# Patient Record
Sex: FEMALE | Race: WHITE | Employment: UNEMPLOYED | URBAN - METROPOLITAN AREA
[De-identification: names, ages, dates, MRNs, and addresses within clinical notes are randomized per-mention and may not be internally consistent; named-entity substitution may affect disease eponyms.]

---

## 2021-08-24 LAB
ABO, EXTERNAL RESULT: NORMAL
HEP B, EXTERNAL RESULT: NEGATIVE
HEPATITIS C ANTIBODY, EXTERNAL RESULT: NORMAL
HIV, EXTERNAL RESULT: NON REACTIVE
RH FACTOR, EXTERNAL RESULT: POSITIVE
RUBELLA TITER, EXTERNAL RESULT: NORMAL

## 2021-09-09 LAB — C. TRACHOMATIS, EXTERNAL RESULT: NEGATIVE

## 2021-09-21 LAB — N. GONORRHOEAE, EXTERNAL RESULT: NEGATIVE

## 2021-12-01 LAB
GLUCOSE-GTT OB 1 HR: 167
GLUCOSE-GTT OB 3 HR: 104

## 2022-01-24 LAB
GBS, EXTERNAL RESULT: NEGATIVE
RPR, EXTERNAL RESULT: NON REACTIVE

## 2022-01-25 ENCOUNTER — HOSPITAL ENCOUNTER (OUTPATIENT)
Age: 27
Discharge: HOME | End: 2022-01-25
Attending: OBSTETRICS & GYNECOLOGY | Admitting: OBSTETRICS & GYNECOLOGY
Payer: MEDICAID

## 2022-01-25 VITALS
HEART RATE: 93 BPM | SYSTOLIC BLOOD PRESSURE: 125 MMHG | HEIGHT: 67 IN | WEIGHT: 202 LBS | BODY MASS INDEX: 31.71 KG/M2 | DIASTOLIC BLOOD PRESSURE: 77 MMHG | RESPIRATION RATE: 16 BRPM | TEMPERATURE: 97.5 F

## 2022-01-25 PROCEDURE — 99211 OFF/OP EST MAY X REQ PHY/QHP: CPT

## 2022-01-25 NOTE — PROGRESS NOTES
Dr. Jeffry Renae at bedside at this time. Discussing options with patient. Patient declines medications and states she will take Tylenol at home. FHR tracing reviewed. Okay to discharge at this time.

## 2022-01-25 NOTE — PROGRESS NOTES
Patient presents to triage with c/o decreased fetal movement. Patient states she had an appointment in the office yesterday where Dr. Keith Solomon told her her belly was measuring smaller than normal for her gestation. Was also told at her appointment that FHR was elevated. States she has only felt 5-6 movements all day, which 2 were on the way to the hospital.  Will update Dr. Meka Sparks of patient arrival and c/o.

## 2022-01-25 NOTE — PROGRESS NOTES
Spoke with Dr. Sandhya Jacques via telephone to make her aware of patient arrival and c/o. FHR reactive tracing, baseline 140's with moderate variability and accelerations. Per Dr. Sandhya Jacques, patient may be discharged at this time.

## 2022-01-26 NOTE — PROGRESS NOTES
Pt verbalized understanding of verbal and written discharge instructions and denies having questions at this time. Pt left OB unit at [address] ambulatory, undelivered, and in stable condition. Patient is not in active labor.    Tuan Azul RN

## 2022-01-26 NOTE — H&P
[facility] and Delivery Triage Note    CHIEF COMPLAINT:  decreased fetal movement    HISTORY OF PRESENT ILLNESS:      The patient is a 27 y.o. female 44w9d. OB History        2    Para   1    Term   1            AB        Living   1       SAB        IAB        Ectopic        Molar        Multiple        Live Births   1              Patient presents complaining of DFM. Though on evaluation patient reports baby is having plenty of movement since arriving to triage. Contractions: none. She reports Normal fetal movement. She denies vaginal bleeding. She denies leakage of amniotic fluid     Estimated Due Date:  Estimated Date of Delivery: 22    PAST MEDICAL HISTORY:   Past Medical History:   Diagnosis Date    Anxiety     Bipolar 2 disorder ([facility]     Stopped medications with +UPT    Depression     H/O seasonal allergies     Hepatitis C     confirmed  per prenatal    Heroin abuse ([facility]     last used 20- inpatient rehab. Finished outpatient rehab 2021       PAST  SURGICAL HISTORY:   Past Surgical History:   Procedure Laterality Date    LIP REPAIR      WISDOM TOOTH EXTRACTION         SOCIAL HISTORY:     reports that she has been smoking. She has a 2.00 pack-year smoking history. She has never used smokeless tobacco. She reports that she does not drink alcohol and does not use drugs. MEDICATIONS:    Prior to Admission medications    Not on File       REVIEW OF SYSTEMS:     Pertinent items are noted in HPI. PHYSICAL EXAM:    Vital Signs: Blood pressure 125/77, pulse 93, temperature 97.5 °F (36.4 °C), temperature source Oral, resp. rate 16, height 5' 7\" (1.702 m), weight 202 lb (91.6 kg), last menstrual period 2021.     CONSTITUTIONAL:  awake, alert, cooperative, no apparent distress, and appears stated age    Fetal heart rate:  Baseline Heart Rate 140, accelerations:  present  long term variability:  moderate  decelerations:  absent  Cervix: Deferred    Contraction frequency: none    Membranes:  intact    ASSESSMENT:    36w5d. There is no problem list on file for this patient.   DFM    PLAN:  - VSS, FHT reassuring, active fetal movement in the triage  - MD evaluation reported to Dr. Keith Zurita per RN, management per Dr. Suzanne Stanton MD  1/25/2022

## 2022-01-31 ENCOUNTER — HOSPITAL ENCOUNTER (OUTPATIENT)
Age: 27
Discharge: HOME | End: 2022-01-31
Payer: MEDICAID

## 2022-01-31 PROCEDURE — U0005 INFEC AGEN DETEC AMPLI PROBE: HCPCS

## 2022-01-31 PROCEDURE — U0003 INFECTIOUS AGENT DETECTION BY NUCLEIC ACID (DNA OR RNA); SEVERE ACUTE RESPIRATORY SYNDROME CORONAVIRUS 2 (SARS-COV-2) (CORONAVIRUS DISEASE [COVID-19]), AMPLIFIED PROBE TECHNIQUE, MAKING USE OF HIGH THROUGHPUT TECHNOLOGIES AS DESCRIBED BY CMS-2020-01-R: HCPCS

## 2022-02-01 LAB — SARS-COV-2: NOT DETECTED

## 2022-02-10 ENCOUNTER — HOSPITAL ENCOUNTER (INPATIENT)
Age: 27
LOS: 3 days | Discharge: HOME | DRG: 560 | End: 2022-02-13
Attending: OBSTETRICS & GYNECOLOGY | Admitting: OBSTETRICS & GYNECOLOGY
Payer: MEDICAID

## 2022-02-10 ENCOUNTER — PREP FOR PROCEDURE (OUTPATIENT)
Dept: OBGYN | Age: 27
End: 2022-02-10

## 2022-02-10 DIAGNOSIS — O36.5930 POOR FETAL GROWTH AFFECTING MANAGEMENT OF MOTHER IN THIRD TRIMESTER, SINGLE OR UNSPECIFIED FETUS: ICD-10-CM

## 2022-02-10 DIAGNOSIS — O98.419 CHRONIC HEPATITIS C COMPLICATING PREGNANCY, ANTEPARTUM (HCC): ICD-10-CM

## 2022-02-10 DIAGNOSIS — Z86.16 HISTORY OF COVID-19: ICD-10-CM

## 2022-02-10 DIAGNOSIS — O41.03X0 OLIGOHYDRAMNIOS IN THIRD TRIMESTER, SINGLE OR UNSPECIFIED FETUS: ICD-10-CM

## 2022-02-10 DIAGNOSIS — B18.2 CHRONIC HEPATITIS C COMPLICATING PREGNANCY, ANTEPARTUM (HCC): ICD-10-CM

## 2022-02-10 LAB
ABO/RH: NORMAL
AMPHETAMINE SCREEN, URINE: NORMAL
ANTIBODY SCREEN: NORMAL
BARBITURATE SCREEN URINE: NORMAL
BENZODIAZEPINE SCREEN, URINE: NORMAL
BUPRENORPHINE URINE: NORMAL
CANNABINOID SCREEN URINE: NORMAL
COCAINE METABOLITE SCREEN URINE: NORMAL
HCT VFR BLD CALC: 35.7 % (ref 36–48)
HEMOGLOBIN: 11.8 G/DL (ref 12–16)
Lab: NORMAL
MCH RBC QN AUTO: 30.7 PG (ref 26–34)
MCHC RBC AUTO-ENTMCNC: 32.9 G/DL (ref 31–36)
MCV RBC AUTO: 93.4 FL (ref 80–100)
METHADONE SCREEN, URINE: NORMAL
OPIATE SCREEN URINE: NORMAL
OXYCODONE URINE: NORMAL
PDW BLD-RTO: 13.9 % (ref 12.4–15.4)
PH UA: 7
PHENCYCLIDINE SCREEN URINE: NORMAL
PLATELET # BLD: 281 K/UL (ref 135–450)
PMV BLD AUTO: 9.8 FL (ref 5–10.5)
PROPOXYPHENE SCREEN: NORMAL
RBC # BLD: 3.83 M/UL (ref 4–5.2)
WBC # BLD: 11.6 K/UL (ref 4–11)

## 2022-02-10 PROCEDURE — 86850 RBC ANTIBODY SCREEN: CPT

## 2022-02-10 PROCEDURE — 86901 BLOOD TYPING SEROLOGIC RH(D): CPT

## 2022-02-10 PROCEDURE — 86592 SYPHILIS TEST NON-TREP QUAL: CPT

## 2022-02-10 PROCEDURE — 80307 DRUG TEST PRSMV CHEM ANLYZR: CPT

## 2022-02-10 PROCEDURE — 85027 COMPLETE CBC AUTOMATED: CPT

## 2022-02-10 PROCEDURE — 2580000003 HC RX 258: Performed by: OBSTETRICS & GYNECOLOGY

## 2022-02-10 PROCEDURE — 86900 BLOOD TYPING SEROLOGIC ABO: CPT

## 2022-02-10 PROCEDURE — 6370000000 HC RX 637 (ALT 250 FOR IP): Performed by: OBSTETRICS & GYNECOLOGY

## 2022-02-10 PROCEDURE — 1220000000 HC SEMI PRIVATE OB R&B

## 2022-02-10 RX ORDER — ALBUTEROL SULFATE 90 UG/1
2 AEROSOL, METERED RESPIRATORY (INHALATION) EVERY 4 HOURS PRN
COMMUNITY
Start: 2017-11-01

## 2022-02-10 RX ORDER — NALBUPHINE HCL 10 MG/ML
10 AMPUL (ML) INJECTION
Status: DISCONTINUED | OUTPATIENT
Start: 2022-02-10 | End: 2022-02-12

## 2022-02-10 RX ORDER — SODIUM CHLORIDE 0.9 % (FLUSH) 0.9 %
5-40 SYRINGE (ML) INJECTION EVERY 12 HOURS SCHEDULED
Status: CANCELLED | OUTPATIENT
Start: 2022-02-10

## 2022-02-10 RX ORDER — SODIUM CHLORIDE 0.9 % (FLUSH) 0.9 %
5-40 SYRINGE (ML) INJECTION EVERY 12 HOURS SCHEDULED
Status: DISCONTINUED | OUTPATIENT
Start: 2022-02-10 | End: 2022-02-12

## 2022-02-10 RX ORDER — ONDANSETRON 2 MG/ML
4 INJECTION INTRAMUSCULAR; INTRAVENOUS EVERY 6 HOURS PRN
Status: CANCELLED | OUTPATIENT
Start: 2022-02-10

## 2022-02-10 RX ORDER — SODIUM CHLORIDE 9 MG/ML
25 INJECTION, SOLUTION INTRAVENOUS PRN
Status: CANCELLED | OUTPATIENT
Start: 2022-02-10

## 2022-02-10 RX ORDER — BUTORPHANOL TARTRATE 1 MG/ML
1 INJECTION, SOLUTION INTRAMUSCULAR; INTRAVENOUS
Status: DISCONTINUED | OUTPATIENT
Start: 2022-02-10 | End: 2022-02-12

## 2022-02-10 RX ORDER — SODIUM CHLORIDE, SODIUM LACTATE, POTASSIUM CHLORIDE, CALCIUM CHLORIDE 600; 310; 30; 20 MG/100ML; MG/100ML; MG/100ML; MG/100ML
INJECTION, SOLUTION INTRAVENOUS CONTINUOUS
Status: DISCONTINUED | OUTPATIENT
Start: 2022-02-10 | End: 2022-02-12

## 2022-02-10 RX ORDER — SODIUM CHLORIDE, SODIUM LACTATE, POTASSIUM CHLORIDE, AND CALCIUM CHLORIDE .6; .31; .03; .02 G/100ML; G/100ML; G/100ML; G/100ML
1000 INJECTION, SOLUTION INTRAVENOUS PRN
Status: DISCONTINUED | OUTPATIENT
Start: 2022-02-10 | End: 2022-02-12

## 2022-02-10 RX ORDER — DOCUSATE SODIUM 100 MG/1
100 CAPSULE, LIQUID FILLED ORAL 2 TIMES DAILY
Status: CANCELLED | OUTPATIENT
Start: 2022-02-10

## 2022-02-10 RX ORDER — SODIUM CHLORIDE 9 MG/ML
25 INJECTION, SOLUTION INTRAVENOUS PRN
Status: DISCONTINUED | OUTPATIENT
Start: 2022-02-10 | End: 2022-02-12

## 2022-02-10 RX ORDER — SODIUM CHLORIDE, SODIUM LACTATE, POTASSIUM CHLORIDE, AND CALCIUM CHLORIDE .6; .31; .03; .02 G/100ML; G/100ML; G/100ML; G/100ML
500 INJECTION, SOLUTION INTRAVENOUS PRN
Status: CANCELLED | OUTPATIENT
Start: 2022-02-10

## 2022-02-10 RX ORDER — DOCUSATE SODIUM 100 MG/1
100 CAPSULE, LIQUID FILLED ORAL 2 TIMES DAILY
Status: DISCONTINUED | OUTPATIENT
Start: 2022-02-10 | End: 2022-02-12

## 2022-02-10 RX ORDER — BUTORPHANOL TARTRATE 1 MG/ML
1 INJECTION, SOLUTION INTRAMUSCULAR; INTRAVENOUS
Status: CANCELLED | OUTPATIENT
Start: 2022-02-10

## 2022-02-10 RX ORDER — SODIUM CHLORIDE, SODIUM LACTATE, POTASSIUM CHLORIDE, CALCIUM CHLORIDE 600; 310; 30; 20 MG/100ML; MG/100ML; MG/100ML; MG/100ML
INJECTION, SOLUTION INTRAVENOUS CONTINUOUS
Status: CANCELLED | OUTPATIENT
Start: 2022-02-10

## 2022-02-10 RX ORDER — SODIUM CHLORIDE, SODIUM LACTATE, POTASSIUM CHLORIDE, AND CALCIUM CHLORIDE .6; .31; .03; .02 G/100ML; G/100ML; G/100ML; G/100ML
500 INJECTION, SOLUTION INTRAVENOUS PRN
Status: DISCONTINUED | OUTPATIENT
Start: 2022-02-10 | End: 2022-02-12

## 2022-02-10 RX ORDER — SODIUM CHLORIDE, SODIUM LACTATE, POTASSIUM CHLORIDE, AND CALCIUM CHLORIDE .6; .31; .03; .02 G/100ML; G/100ML; G/100ML; G/100ML
1000 INJECTION, SOLUTION INTRAVENOUS PRN
Status: CANCELLED | OUTPATIENT
Start: 2022-02-10

## 2022-02-10 RX ORDER — ONDANSETRON 2 MG/ML
4 INJECTION INTRAMUSCULAR; INTRAVENOUS EVERY 6 HOURS PRN
Status: DISCONTINUED | OUTPATIENT
Start: 2022-02-10 | End: 2022-02-12

## 2022-02-10 RX ORDER — NALBUPHINE HCL 10 MG/ML
10 AMPUL (ML) INJECTION
Status: CANCELLED | OUTPATIENT
Start: 2022-02-10

## 2022-02-10 RX ORDER — SODIUM CHLORIDE 0.9 % (FLUSH) 0.9 %
5-40 SYRINGE (ML) INJECTION PRN
Status: DISCONTINUED | OUTPATIENT
Start: 2022-02-10 | End: 2022-02-12

## 2022-02-10 RX ORDER — SODIUM CHLORIDE 0.9 % (FLUSH) 0.9 %
5-40 SYRINGE (ML) INJECTION PRN
Status: CANCELLED | OUTPATIENT
Start: 2022-02-10

## 2022-02-10 RX ADMIN — DINOPROSTONE 10 MG: 10 INSERT VAGINAL at 21:16

## 2022-02-10 RX ADMIN — SODIUM CHLORIDE, POTASSIUM CHLORIDE, SODIUM LACTATE AND CALCIUM CHLORIDE: 600; 310; 30; 20 INJECTION, SOLUTION INTRAVENOUS at 21:18

## 2022-02-10 NOTE — PROGRESS NOTES
Received call from [facility] at [facility] indicating patient had an [address] indicating IUGR & oligohydramnios - pt was instructed to go to [facility] for IOL. [place] US - 2/10/22  EFW - 2550g (<3%), vtx, AC&HC (<1%); FL(1.6%); PAKO-4.67cm, BPP -8/8, Umb Art DFS - WNL.     US report faxed to [facility]

## 2022-02-11 ENCOUNTER — ANESTHESIA EVENT (OUTPATIENT)
Dept: LABOR AND DELIVERY | Age: 27
DRG: 560 | End: 2022-02-11
Payer: MEDICAID

## 2022-02-11 ENCOUNTER — ANESTHESIA (OUTPATIENT)
Dept: LABOR AND DELIVERY | Age: 27
DRG: 560 | End: 2022-02-11
Payer: MEDICAID

## 2022-02-11 PROBLEM — Z82.79 FAMILY HISTORY OF CLEFT LIP: Status: ACTIVE | Noted: 2022-02-11

## 2022-02-11 PROBLEM — F19.11 HISTORY OF SUBSTANCE ABUSE (HCC): Status: ACTIVE | Noted: 2022-02-11

## 2022-02-11 PROBLEM — F31.9 BIPOLAR DISEASE DURING PREGNANCY IN THIRD TRIMESTER (HCC): Status: ACTIVE | Noted: 2022-02-11

## 2022-02-11 PROBLEM — Z82.79 FAMILY HISTORY OF CONGENITAL HEART DEFECT: Status: ACTIVE | Noted: 2022-02-11

## 2022-02-11 PROBLEM — O99.330 TOBACCO SMOKING AFFECTING PREGNANCY: Status: ACTIVE | Noted: 2022-02-11

## 2022-02-11 PROBLEM — O99.810 ABNORMAL O'SULLIVAN GLUCOSE CHALLENGE TEST, ANTEPARTUM: Status: ACTIVE | Noted: 2022-02-11

## 2022-02-11 PROBLEM — O99.343 BIPOLAR DISEASE DURING PREGNANCY IN THIRD TRIMESTER (HCC): Status: ACTIVE | Noted: 2022-02-11

## 2022-02-11 LAB — RPR: NORMAL

## 2022-02-11 PROCEDURE — 7200000001 HC VAGINAL DELIVERY

## 2022-02-11 PROCEDURE — 3E033VJ INTRODUCTION OF OTHER HORMONE INTO PERIPHERAL VEIN, PERCUTANEOUS APPROACH: ICD-10-PCS | Performed by: OBSTETRICS & GYNECOLOGY

## 2022-02-11 PROCEDURE — 6360000002 HC RX W HCPCS: Performed by: OBSTETRICS & GYNECOLOGY

## 2022-02-11 PROCEDURE — 3700000025 EPIDURAL BLOCK: Performed by: ANESTHESIOLOGY

## 2022-02-11 PROCEDURE — 1220000000 HC SEMI PRIVATE OB R&B

## 2022-02-11 PROCEDURE — 3E0P7VZ INTRODUCTION OF HORMONE INTO FEMALE REPRODUCTIVE, VIA NATURAL OR ARTIFICIAL OPENING: ICD-10-PCS | Performed by: OBSTETRICS & GYNECOLOGY

## 2022-02-11 PROCEDURE — 2580000003 HC RX 258: Performed by: OBSTETRICS & GYNECOLOGY

## 2022-02-11 PROCEDURE — 51702 INSERT TEMP BLADDER CATH: CPT

## 2022-02-11 PROCEDURE — 88307 TISSUE EXAM BY PATHOLOGIST: CPT

## 2022-02-11 PROCEDURE — 2500000003 HC RX 250 WO HCPCS: Performed by: NURSE ANESTHETIST, CERTIFIED REGISTERED

## 2022-02-11 RX ORDER — BUPIVACAINE HYDROCHLORIDE 5 MG/ML
INJECTION, SOLUTION EPIDURAL; INTRACAUDAL PRN
Status: DISCONTINUED | OUTPATIENT
Start: 2022-02-11 | End: 2022-02-11 | Stop reason: SDUPTHER

## 2022-02-11 RX ORDER — LIDOCAINE HYDROCHLORIDE 20 MG/ML
INJECTION, SOLUTION EPIDURAL; INFILTRATION; INTRACAUDAL; PERINEURAL PRN
Status: DISCONTINUED | OUTPATIENT
Start: 2022-02-11 | End: 2022-02-11 | Stop reason: SDUPTHER

## 2022-02-11 RX ORDER — BUPIVACAINE HYDROCHLORIDE 2.5 MG/ML
INJECTION, SOLUTION EPIDURAL; INFILTRATION; INTRACAUDAL PRN
Status: DISCONTINUED | OUTPATIENT
Start: 2022-02-11 | End: 2022-02-11 | Stop reason: SDUPTHER

## 2022-02-11 RX ADMIN — BUPIVACAINE HYDROCHLORIDE 4 ML: 2.5 INJECTION, SOLUTION EPIDURAL; INFILTRATION; INTRACAUDAL; PERINEURAL at 22:56

## 2022-02-11 RX ADMIN — SODIUM CHLORIDE, POTASSIUM CHLORIDE, SODIUM LACTATE AND CALCIUM CHLORIDE: 600; 310; 30; 20 INJECTION, SOLUTION INTRAVENOUS at 14:11

## 2022-02-11 RX ADMIN — Medication 1 MILLI-UNITS/MIN: at 11:28

## 2022-02-11 RX ADMIN — SODIUM CHLORIDE, POTASSIUM CHLORIDE, SODIUM LACTATE AND CALCIUM CHLORIDE: 600; 310; 30; 20 INJECTION, SOLUTION INTRAVENOUS at 07:22

## 2022-02-11 RX ADMIN — BUTORPHANOL TARTRATE 1 MG: 1 INJECTION, SOLUTION INTRAMUSCULAR; INTRAVENOUS at 12:52

## 2022-02-11 RX ADMIN — SODIUM CHLORIDE, POTASSIUM CHLORIDE, SODIUM LACTATE AND CALCIUM CHLORIDE: 600; 310; 30; 20 INJECTION, SOLUTION INTRAVENOUS at 22:05

## 2022-02-11 RX ADMIN — Medication 15 ML/HR: at 13:48

## 2022-02-11 RX ADMIN — LIDOCAINE HYDROCHLORIDE 2 ML: 20 INJECTION, SOLUTION EPIDURAL; INFILTRATION; INTRACAUDAL; PERINEURAL at 22:56

## 2022-02-11 RX ADMIN — BUPIVACAINE HYDROCHLORIDE 4 ML: 5 INJECTION, SOLUTION EPIDURAL; INTRACAUDAL; PERINEURAL at 22:56

## 2022-02-11 ASSESSMENT — PAIN SCALES - GENERAL: PAINLEVEL_OUTOF10: 4

## 2022-02-11 ASSESSMENT — LIFESTYLE VARIABLES: SMOKING_STATUS: 1

## 2022-02-11 NOTE — PROGRESS NOTES
This RN reviewed all documentation and supervised all care done by Gerardo Springer, MARIBELLN student.

## 2022-02-11 NOTE — PLAN OF CARE
Problem: Anxiety:  Goal: Level of anxiety will decrease  Description: Level of anxiety will decrease  2/11/2022 0922 by Dony Parada RN  Outcome: Ongoing  2/10/2022 2036 by Moreno Woodard RN  Outcome: Ongoing     Problem: Breathing Pattern - Ineffective:  Goal: Able to breathe comfortably  Description: Able to breathe comfortably  2/11/2022 0922 by Dony Parada RN  Outcome: Ongoing  2/10/2022 2036 by Moreno Woodard RN  Outcome: Ongoing     Problem: Fluid Volume - Imbalance:  Goal: Absence of imbalanced fluid volume signs and symptoms  Description: Absence of imbalanced fluid volume signs and symptoms  2/11/2022 0922 by Dony Parada RN  Outcome: Ongoing  2/10/2022 2036 by Moreno Woodard RN  Outcome: Ongoing  Goal: Absence of intrapartum hemorrhage signs and symptoms  Description: Absence of intrapartum hemorrhage signs and symptoms  2/11/2022 0922 by Dony Parada RN  Outcome: Ongoing  2/10/2022 2036 by Moreno Woodard RN  Outcome: Ongoing     Problem: Infection - Intrapartum Infection:  Goal: Will show no infection signs and symptoms  Description: Will show no infection signs and symptoms  2/11/2022 0922 by Dony Parada RN  Outcome: Ongoing  2/10/2022 2036 by Moreno Woodard RN  Outcome: Ongoing     Problem: Labor Process - Prolonged:  Goal: Labor progression, first stage, within specified pattern  Description: Labor progression, first stage, within specified pattern  2/11/2022 0922 by Dony Parada RN  Outcome: Ongoing  2/10/2022 2036 by Moreno Woodard RN  Outcome: Ongoing  Goal: Labor progession, second stage, within specified pattern  Description: Labor progession, second stage, within specified pattern  2/11/2022 0922 by Dony Parada RN  Outcome: Ongoing  2/10/2022 2036 by Moreno Woodard RN  Outcome: Ongoing  Goal: Uterine contractions within specified parameters  Description: Uterine contractions within specified parameters  2/11/2022 0922 by Dony Parada RN  Outcome: Ongoing  2/10/2022 2036 by Jose Mayfield Frances Philippe RN  Outcome: Ongoing     Problem:  Screening:  Goal: Ability to make informed decisions regarding treatment has improved  Description: Ability to make informed decisions regarding treatment has improved  2022 by Sharyn Hernández RN  Outcome: Ongoing  2/10/2022 2036 by Julia Hensley RN  Outcome: Ongoing     Problem: Pain - Acute:  Goal: Pain level will decrease  Description: Pain level will decrease  2/10/2022 2036 by Julia Hensley RN  Outcome: Ongoing  Goal: Able to cope with pain  Description: Able to cope with pain  2022 by Sharyn Hernández RN  Outcome: Ongoing  2/10/2022 2036 by Julia Hensley RN  Outcome: Ongoing     Problem: Tissue Perfusion - Uteroplacental, Altered:  Description: [TRUNCATED] For intrapartum patients with recurrent variable decelerations of the fetal heart rate, consider transcervical amnioinfusion. For patients in labor, avoid prophylactic use of continuous maternal oxygen supplementation to prevent nonreassu . .. Goal: Absence of abnormal fetal heart rate pattern  Description: Absence of abnormal fetal heart rate pattern  2022 by Sharyn Hernández RN  Outcome: Ongoing  2/10/2022 2036 by Julia Hensley RN  Outcome: Ongoing     Problem: Urinary Retention:  Goal: Experiences of bladder distention will decrease  Description: Experiences of bladder distention will decrease  2022 by Sharyn Hernández RN  Outcome: Ongoing  2/10/2022 2036 by Julia Hensley RN  Outcome: Ongoing  Goal: Urinary elimination within specified parameters  Description: Urinary elimination within specified parameters  2022 by Sharyn Hernández RN  Outcome: Ongoing  2/10/2022 2036 by Julia Hensley RN  Outcome: Ongoing     Problem: Pain:  Description: Pain management should include both nonpharmacologic and pharmacologic interventions.   Goal: Pain level will decrease  Description: Pain level will decrease  2/10/2022 2036 by Julia Hensley RN  Outcome: Ongoing

## 2022-02-11 NOTE — H&P
Department of Obstetrics and Gynecology  Labor and Delivery Admit Note        CHIEF COMPLAINT:  IUGR, referred directly from [facility] for IOL    HISTORY OF PRESENT ILLNESS:      The patient is a 27 y.o. female 39w1d. OB History        2    Para   1    Term   1            AB        Living   1       SAB        IAB        Ectopic        Molar        Multiple        Live Births   1              Pt seen yesterday by [facility] for S<D, IUGR and oligo were noted on Level II and pt was sent in for IOL. She received 1 dose of cervidil, followed by pitocin which is now at 3mU. Minimal cervical change. I attempted to place a cervical garcia but pt c/o severe pain with exam and garcia was not placed. No ctx, no vb, no lof, +fm. Preg complicated by IUGR w/oligo, Hep C, h/o heroin abuse s/p rehab which ended 2021, h/o bipolar d/o on no meds at this time, increased gct with nl gtt, tobacco use, Covid 1 month ago, FHx congenital heart defect and cleft lip, mild asthma. Estimated Due Date:  Estimated Date of Delivery: 22    PAST MEDICAL HISTORY:   Past Medical History:   Diagnosis Date    Anxiety     Bipolar 2 disorder ([facility]     Stopped medications with +UPT    Depression     H/O seasonal allergies     Hepatitis C     confirmed  per prenatal    Heroin abuse ([facility]     last used 20- inpatient rehab. Finished outpatient rehab 2021       PAST  SURGICAL HISTORY:   Past Surgical History:   Procedure Laterality Date    LIP REPAIR      WISDOM TOOTH EXTRACTION         SOCIAL HISTORY:     reports that she has been smoking. She has a 1.00 pack-year smoking history. She has never used smokeless tobacco. She reports that she does not drink alcohol and does not use drugs. MEDICATIONS:    Prior to Admission medications    Medication Sig Start Date End Date Taking?  Authorizing Provider   albuterol sulfate  (90 Base) MCG/ACT inhaler Inhale 2 puffs into the lungs every 4 hours as needed 17 Historical Provider, MD        PRENATAL CARE:    Complicated by: see HPI    REVIEW OF SYSTEMS:     Pertinent items are noted in HPI. PHYSICAL EXAM:    Vital Signs:   Vitals:    02/11/22 1230   BP: 105/61   Pulse: 71   Resp: 16   Temp: 97.5 °F (36.4 °C)   SpO2:        /61   Pulse 71   Temp 97.5 °F (36.4 °C) (Oral)   Resp 16   Ht 5' 8\" (1.727 m)   Wt 202 lb (91.6 kg)   LMP 05/26/2021   SpO2 98%   BMI 30.71 kg/m²   General appearance: alert, appears stated age, cooperative and no distress  Back: symmetric, no curvature. ROM normal. No CVA tenderness.   Lungs: clear to auscultation bilaterally  Heart: regular rate and rhythm  Abdomen: term gravid, NT throughout  Neurologic: Grossly normal    Fetal heart rate:  Baseline Heart Rate 140, accelerations:  present  long term variability:  moderate  decelerations:  absent  Cervix:  1 cm 50% medium -2, pt c/o pain w/exam    Contraction frequency:  No ctx    Membranes:  Intact    General Labs:      CBC:   Lab Results   Component Value Date    WBC 11.6 02/10/2022    RBC 3.83 02/10/2022    HGB 11.8 02/10/2022    HCT 35.7 02/10/2022    MCV 93.4 02/10/2022    MCH 30.7 02/10/2022    MCHC 32.9 02/10/2022    RDW 13.9 02/10/2022     02/10/2022    MPV 9.8 02/10/2022       [facility] US - 2/10/22  EFW - 2550g (<3%), vtx, AC&HC (<1%); FL(1.6%); PAKO-4.67cm, BPP -8/8, Umb Art DFS - WNL      ASSESSMENT/PLAN:    Patient Active Problem List   Diagnosis    Poor fetal growth affecting management of mother in third trimester    Chronic hepatitis C complicating pregnancy, antepartum ([facility]    History of COVID-19    Oligohydramnios in third trimester    Intrauterine growth restriction (IUGR) affecting care of mother, third trimester, single gestation    Family history of congenital heart defect    Family history of cleft lip    Bipolar disease during pregnancy in third trimester ([facility]    History of substance abuse ([address]    Tobacco smoking affecting pregnancy    Abnormal O'Jc glucose challenge test, antepartum      27 y.o. female 39w1d. OB History        2    Para   1    Term   1            AB        Living   1       SAB        IAB        Ectopic        Molar        Multiple        Live Births   1              IOL s/p cervidil, now on pitocin. Pt intolerant of exam with 1 dose of stadol. Will proceed with epidural followed by placement of garcia bulb. Reassuring fetal status. GBS neg.       Pilar Coffman MD  2022

## 2022-02-11 NOTE — PROGRESS NOTES
SVE performed per request from Dr. Katie Weber. Barajas bulb found to be sitting in the vagina and was removed at this time. SVE 4/60/-2. Dr. Katie Weber updated, states he will be in after a little while and to keep titrating the pitocin per protocol. Will monitor.

## 2022-02-11 NOTE — ANESTHESIA PRE PROCEDURE
Department of Anesthesiology  Preprocedure Note       Name:  Yodit Tyler   Age:  27 y.o.  :  1995                                          MRN:  [id number]         Date:  2022      Surgeon: * No surgeons listed *    Procedure: * No procedures listed *    Medications prior to admission:   Prior to Admission medications    Medication Sig Start Date End Date Taking?  Authorizing Provider   albuterol sulfate  (90 Base) MCG/ACT inhaler Inhale 2 puffs into the lungs every 4 hours as needed 17   Historical Provider, MD       Current medications:    Current Facility-Administered Medications   Medication Dose Route Frequency Provider Last Rate Last Admin    0.9 % sodium chloride infusion  25 mL IntraVENous PRN Kathrynn Brush, DO        butorphanol (STADOL) injection 1 mg  1 mg IntraVENous Q3H PRN Kathrynn Brush, DO   1 mg at 22 1252    docusate sodium (COLACE) capsule 100 mg  100 mg Oral BID Kathrynn Brush, DO        famotidine (PEPCID) injection 20 mg  20 mg IntraVENous BID Kathrynn Brush, DO        lactated ringers bolus  500 mL IntraVENous PRN Kathrynn Brush, DO        Or    lactated ringers bolus  1,000 mL IntraVENous PRN Kathrynn Brush, DO        lactated ringers infusion   IntraVENous Continuous Kathrynn Brush, DO 75 mL/hr at 22 1114 Restarted at 22 1114    nalbuphine (NUBAIN) injection 10 mg  10 mg IntraVENous Q2H PRN Kathrynn Brush, DO        ondansetron [facility]) injection 4 mg  4 mg IntraVENous Q6H PRN Kathrynn Brush, DO        oxytocin (PITOCIN) 30 units in 500 mL infusion  87.3 laura-units/min IntraVENous Continuous PRN Kathrynn Brush, DO        And    oxytocin (PITOCIN) 10 unit bolus from the bag  10 Units IntraVENous PRN Kathrynn Brush, DO        oxytocin (PITOCIN) 30 units in 500 mL infusion  1-20 laura-units/min IntraVENous Continuous Kathrynn Brush, DO 3 mL/hr at 22 1230 3 laura-units/min at 22 1230    sodium chloride flush 0.9 % injection 5-40 mL  5-40 mL IntraVENous 2 times per day Shayna Courtney, DO        sodium chloride flush 0.9 % injection 5-40 mL  5-40 mL IntraVENous PRN Shayna Courtney,          Facility-Administered Medications Ordered in Other Encounters   Medication Dose Route Frequency Provider Last Rate Last Admin    sodium chloride 0.9 % 200 mL with fentaNYL 500 mcg, bupivacaine 0.5% 50 mL (OB) epidural   Epidural Continuous PRN SABINO Vazquez - CRNA 15 mL/hr at 02/11/22 1348 15 mL/hr at 02/11/22 1348       Allergies:  No Known Allergies    Problem List:    Patient Active Problem List   Diagnosis Code    Poor fetal growth affecting management of mother in third trimester O36.5930    Chronic hepatitis C complicating pregnancy, antepartum ([facility] O98.419, B18.2    History of COVID-19 Z86.16    Oligohydramnios in third trimester O41. 5X0    Intrauterine growth restriction (IUGR) affecting care of mother, third trimester, single gestation O45.26    Family history of congenital heart defect Z80.66    Family history of cleft lip Z82.79    Bipolar disease during pregnancy in third trimester ([facility] O80.12, F31.9    History of substance abuse ([facility] F19.11    Tobacco smoking affecting pregnancy O99.330    Abnormal O'Jc glucose challenge test, antepartum O99.810       Past Medical History:        Diagnosis Date    Anxiety     Bipolar 2 disorder ([address]     Stopped medications with +UPT    Depression     H/O seasonal allergies     Hepatitis C     confirmed 8/29 per prenatal    Heroin abuse ([facility]     last used 9/28/20- inpatient rehab.  Finished outpatient rehab 4/2021       Past Surgical History:        Procedure Laterality Date    LIP REPAIR      WISDOM TOOTH EXTRACTION         Social History:    Social History     Tobacco Use    Smoking status: Current Every Day Smoker     Packs/day: 0.25     Years: 4.00     Pack years: 1.00    Smokeless tobacco: Never Used   Substance Use Topics  Alcohol use: No     Alcohol/week: 1.0 standard drink     Types: 1 Glasses of wine per week                                Ready to quit: No  Counseling given: Yes      Vital Signs (Current):   Vitals:    02/11/22 1348 02/11/22 1351 02/11/22 1354 02/11/22 1357   BP: 117/75 110/67 108/63 108/65   Pulse: 80 71 67 74   Resp: 16 16 16 16   Temp:       TempSrc:       SpO2:       Weight:       Height:                                                  BP Readings from Last 3 Encounters:   02/11/22 108/65   01/25/22 125/77   02/26/18 124/83       NPO Status:                                                                                 BMI:   Wt Readings from Last 3 Encounters:   02/10/22 202 lb (91.6 kg)   01/25/22 202 lb (91.6 kg)   02/26/18 150 lb (68 kg)     Body mass index is 30.71 kg/m². CBC:   Lab Results   Component Value Date    WBC 11.6 02/10/2022    RBC 3.83 02/10/2022    HGB 11.8 02/10/2022    HCT 35.7 02/10/2022    MCV 93.4 02/10/2022    RDW 13.9 02/10/2022     02/10/2022       CMP: No results found for: NA, K, CL, CO2, BUN, CREATININE, GFRAA, AGRATIO, LABGLOM, GLUCOSE, GLU, PROT, CALCIUM, BILITOT, ALKPHOS, AST, ALT    POC Tests: No results for input(s): POCGLU, POCNA, POCK, POCCL, POCBUN, POCHEMO, POCHCT in the last 72 hours.     Coags: No results found for: PROTIME, INR, APTT    HCG (If Applicable):   Lab Results   Component Value Date    PREGTESTUR Neg 03/04/2011        ABGs: No results found for: PHART, PO2ART, KLG2XAZ, BOO2OHT, BEART, T7WYYKOO     Type & Screen (If Applicable):  No results found for: LABABO, LABRH    Drug/Infectious Status (If Applicable):  No results found for: HIV, HEPCAB    COVID-19 Screening (If Applicable):   Lab Results   Component Value Date    COVID19 Not Detected 01/31/2022           Anesthesia Evaluation  Patient summary reviewed and Nursing notes reviewed no history of anesthetic complications:   Airway: Mallampati: II  TM distance: >3 FB   Neck ROM: full  Mouth opening: > = 3 FB Dental:          Pulmonary:   (+) asthma: current smoker                           Cardiovascular:Negative CV ROS                      Neuro/Psych:   (+) psychiatric history:            GI/Hepatic/Renal:   (+) hepatitis: C, liver disease:,           Endo/Other: Negative Endo/Other ROS                    Abdominal:   (+) obese,           Vascular: negative vascular ROS. Other Findings:             Anesthesia Plan      epidural     ASA 3 - emergent             Anesthetic plan and risks discussed with patient. Plan discussed with attending. Alternatives to, benifits and risks of continuous lumbar epidural for labor (including, but not limited to, hypotension, spinal headache, inadequate sensory blockade) were discussed in detail with the patient. All questions were answered to her satisfaction.   The patient desires and agrees to proceed with continuous epidural.      SABINO Meng - CRNA   2/11/2022

## 2022-02-11 NOTE — ANESTHESIA PROCEDURE NOTES
Epidural Block    Patient location during procedure: OB  Start time: 2/11/2022 1:38 PM  End time: 2/11/2022 1:46 PM  Reason for block: labor epidural  Staffing  Performed: resident/CRNA   Anesthesiologist: Zion Gold MD  Resident/CRNA: SABINO Hayden - CRNA  Preanesthetic Checklist  Completed: patient identified, IV checked, site marked, risks and benefits discussed, monitors and equipment checked, pre-op evaluation, timeout performed, anesthesia consent given, oxygen available and patient being monitored  Epidural  Patient position: sitting  Prep: Betadine  Patient monitoring: cardiac monitor, continuous pulse ox and frequent blood pressure checks  Approach: midline  Location: lumbar (1-5)  Injection technique: DARRICK saline  Provider prep: mask and sterile gloves  Needle  Needle type: Tuohy   Needle gauge: 17 G  Needle length: 3.5 in  Catheter type: side hole  Catheter size: 19 G (20 G)  Test dose: negative (3 + 2 cc of 1.5 % xylocaine with epi)  Assessment  Sensory level: T8  Hemodynamics: stable  Attempts: 1  Additional Notes  Pt. prepped and draped in sterile fashion. Skin wheal with 1% lidocaine. 17ga touhy needle to DARRICK. 25 ga. Spinal needle per touhy. CSF visualized in hub. Needle withdrawn and catheter threaded. Negative test dose. Catheter secured with sterile dressing.

## 2022-02-11 NOTE — PROGRESS NOTES
Garcia bulb placement unsuccessful d/t the pt not being able to tolerate placement of the catheter. Dr. Loyd Day would like to proceed with and epidural before proceeding with another garcia bulb attempt. Pt agreeable to plan.

## 2022-02-11 NOTE — PROGRESS NOTES
Dr. Nevaeh Yadav notified of patient arrival and admission. Induction for IUGR and oligohydramnios. SVE per this RN 1/40/-3. Patient is not gisselle and comfortable. EFM tracing is Category I at this time. Order received to place cervidil indicated by IUGR.

## 2022-02-11 NOTE — PROGRESS NOTES
This RN reviewed all documentation and supervised all care done by Vimal nursing student.   Nikolay Hernandez RN

## 2022-02-11 NOTE — PROGRESS NOTES
OB MD PN    Pt comfortable s/p epidural. Cervical garcia placed and filled with 50ml saline.  1-2/60/-2/med/post.

## 2022-02-12 LAB
HCT VFR BLD CALC: 32 % (ref 36–48)
HEMOGLOBIN: 10.8 G/DL (ref 12–16)
MCH RBC QN AUTO: 31.4 PG (ref 26–34)
MCHC RBC AUTO-ENTMCNC: 33.6 G/DL (ref 31–36)
MCV RBC AUTO: 93.4 FL (ref 80–100)
PDW BLD-RTO: 14 % (ref 12.4–15.4)
PLATELET # BLD: 234 K/UL (ref 135–450)
PMV BLD AUTO: 9.1 FL (ref 5–10.5)
RBC # BLD: 3.43 M/UL (ref 4–5.2)
WBC # BLD: 13.9 K/UL (ref 4–11)

## 2022-02-12 PROCEDURE — 1220000000 HC SEMI PRIVATE OB R&B

## 2022-02-12 PROCEDURE — 6370000000 HC RX 637 (ALT 250 FOR IP): Performed by: OBSTETRICS & GYNECOLOGY

## 2022-02-12 PROCEDURE — 36415 COLL VENOUS BLD VENIPUNCTURE: CPT

## 2022-02-12 PROCEDURE — 85027 COMPLETE CBC AUTOMATED: CPT

## 2022-02-12 PROCEDURE — 6360000002 HC RX W HCPCS: Performed by: OBSTETRICS & GYNECOLOGY

## 2022-02-12 RX ORDER — ONDANSETRON 2 MG/ML
4 INJECTION INTRAMUSCULAR; INTRAVENOUS EVERY 6 HOURS PRN
Status: DISCONTINUED | OUTPATIENT
Start: 2022-02-12 | End: 2022-02-13 | Stop reason: HOSPADM

## 2022-02-12 RX ORDER — KETOROLAC TROMETHAMINE 30 MG/ML
30 INJECTION, SOLUTION INTRAMUSCULAR; INTRAVENOUS EVERY 6 HOURS PRN
Status: DISCONTINUED | OUTPATIENT
Start: 2022-02-12 | End: 2022-02-12

## 2022-02-12 RX ORDER — DOCUSATE SODIUM 100 MG/1
100 CAPSULE, LIQUID FILLED ORAL 2 TIMES DAILY
Qty: 30 CAPSULE | Refills: 0 | Status: ON HOLD | OUTPATIENT
Start: 2022-02-12 | End: 2022-03-11

## 2022-02-12 RX ORDER — IBUPROFEN 800 MG/1
800 TABLET ORAL EVERY 8 HOURS
Status: DISCONTINUED | OUTPATIENT
Start: 2022-02-12 | End: 2022-02-13 | Stop reason: HOSPADM

## 2022-02-12 RX ORDER — FERROUS SULFATE 325(65) MG
325 TABLET ORAL 2 TIMES DAILY WITH MEALS
Status: DISCONTINUED | OUTPATIENT
Start: 2022-02-12 | End: 2022-02-13 | Stop reason: HOSPADM

## 2022-02-12 RX ORDER — DOCUSATE SODIUM 100 MG/1
100 CAPSULE, LIQUID FILLED ORAL 2 TIMES DAILY
Status: DISCONTINUED | OUTPATIENT
Start: 2022-02-12 | End: 2022-02-13 | Stop reason: HOSPADM

## 2022-02-12 RX ORDER — IBUPROFEN 800 MG/1
800 TABLET ORAL EVERY 6 HOURS PRN
Status: DISCONTINUED | OUTPATIENT
Start: 2022-02-12 | End: 2022-02-12

## 2022-02-12 RX ORDER — IBUPROFEN 800 MG/1
800 TABLET ORAL EVERY 8 HOURS PRN
Qty: 30 TABLET | Refills: 0 | Status: SHIPPED | OUTPATIENT
Start: 2022-02-12

## 2022-02-12 RX ORDER — SODIUM CHLORIDE, SODIUM LACTATE, POTASSIUM CHLORIDE, CALCIUM CHLORIDE 600; 310; 30; 20 MG/100ML; MG/100ML; MG/100ML; MG/100ML
INJECTION, SOLUTION INTRAVENOUS CONTINUOUS
Status: DISCONTINUED | OUTPATIENT
Start: 2022-02-12 | End: 2022-02-12

## 2022-02-12 RX ORDER — SODIUM CHLORIDE 0.9 % (FLUSH) 0.9 %
5-40 SYRINGE (ML) INJECTION EVERY 12 HOURS SCHEDULED
Status: DISCONTINUED | OUTPATIENT
Start: 2022-02-12 | End: 2022-02-13 | Stop reason: HOSPADM

## 2022-02-12 RX ORDER — ACETAMINOPHEN 325 MG/1
650 TABLET ORAL EVERY 4 HOURS PRN
Status: DISCONTINUED | OUTPATIENT
Start: 2022-02-12 | End: 2022-02-12

## 2022-02-12 RX ORDER — ACETAMINOPHEN 500 MG
1000 TABLET ORAL EVERY 6 HOURS
Status: DISCONTINUED | OUTPATIENT
Start: 2022-02-12 | End: 2022-02-13 | Stop reason: HOSPADM

## 2022-02-12 RX ORDER — LANOLIN 100 %
OINTMENT (GRAM) TOPICAL PRN
Status: DISCONTINUED | OUTPATIENT
Start: 2022-02-12 | End: 2022-02-13 | Stop reason: HOSPADM

## 2022-02-12 RX ORDER — METHYLERGONOVINE MALEATE 0.2 MG/ML
200 INJECTION INTRAVENOUS PRN
Status: DISCONTINUED | OUTPATIENT
Start: 2022-02-12 | End: 2022-02-13 | Stop reason: HOSPADM

## 2022-02-12 RX ORDER — SODIUM CHLORIDE 9 MG/ML
25 INJECTION, SOLUTION INTRAVENOUS PRN
Status: DISCONTINUED | OUTPATIENT
Start: 2022-02-12 | End: 2022-02-12

## 2022-02-12 RX ORDER — SODIUM CHLORIDE 0.9 % (FLUSH) 0.9 %
5-40 SYRINGE (ML) INJECTION PRN
Status: DISCONTINUED | OUTPATIENT
Start: 2022-02-12 | End: 2022-02-12

## 2022-02-12 RX ADMIN — ACETAMINOPHEN 650 MG: 325 TABLET ORAL at 08:54

## 2022-02-12 RX ADMIN — IBUPROFEN 800 MG: 800 TABLET, FILM COATED ORAL at 21:23

## 2022-02-12 RX ADMIN — IBUPROFEN 800 MG: 800 TABLET, FILM COATED ORAL at 06:10

## 2022-02-12 RX ADMIN — IBUPROFEN 800 MG: 800 TABLET, FILM COATED ORAL at 14:08

## 2022-02-12 RX ADMIN — DOCUSATE SODIUM 100 MG: 100 CAPSULE, LIQUID FILLED ORAL at 08:54

## 2022-02-12 RX ADMIN — DOCUSATE SODIUM 100 MG: 100 CAPSULE, LIQUID FILLED ORAL at 21:23

## 2022-02-12 RX ADMIN — KETOROLAC TROMETHAMINE 30 MG: 30 INJECTION, SOLUTION INTRAMUSCULAR at 01:33

## 2022-02-12 RX ADMIN — ACETAMINOPHEN 1000 MG: 500 TABLET ORAL at 17:58

## 2022-02-12 RX ADMIN — FERROUS SULFATE TAB 325 MG (65 MG ELEMENTAL FE) 325 MG: 325 (65 FE) TAB at 17:57

## 2022-02-12 ASSESSMENT — PAIN SCALES - GENERAL
PAINLEVEL_OUTOF10: 2
PAINLEVEL_OUTOF10: 3

## 2022-02-12 NOTE — PROGRESS NOTES
Asked to place IUPC by primary OBGYN     Cat 1 FHTs   SVE 5/70/-2 , IUPC placed without difficulty    Dr. Tea Barbour updated by MARY Arevalo MD

## 2022-02-12 NOTE — L&D DELIVERY SUMMARY NOTE
Department of Obstetrics and Gynecology  Spontaneous Vaginal Delivery Note      Pre-operative Diagnosis:  Induced labor @39+1, IUGR    Post-operative Diagnosis:  Living  infant(s) and Female    Information for the patient's :  Ubaldo Banda Baby Girl Lillian [[id number]]        Infant Wt:   Information for the patient's :  Triston Wheeler [[id number]]           APGARS:     Information for the patient's :  Dorice Bandar Girl [address] [[id number]]           Anesthesia:  epidural anesthesia      Delivery Summary:  Pt progressed to complete after cervidil, garcia bulb, pitocin and SROM. Pushed for 10min to deliver a vigorous female KAROL. Shoulders followed easily. Infant placed on pt abd, cord clamped/cut when pulsation stopped. Placenta delivered 3VI w/gentle traction. Inspection revealed bilat labial and posterior perineal 1st degree lacerations, hemostatic, no repair. Fundus firm. Cord gas sent. EBL: 100ml    Specimen:  Placenta sent to pathology     Intake/Output:     Date 02/10/22 1901 - 22 0700 22 07 - 22 0700   Shift 9836-7669 24 Hour Total 2199-8568 2301-6820 24 Hour Total   INTAKE   I.V. 641.1 641.1 245 2094.7 2339.7   Shift Total 641.1 641.1 245 2094.7 2339.7   OUTPUT   Urine  200 1300   Shift Total  200 1300   .1 541.1 -855 1894.7 1039.7       Condition:  mother and infant stable    Blood Type and Rh: O POS    Attending Attestation: I performed the procedure.   Assistant: Juan Bui DO

## 2022-02-12 NOTE — PROGRESS NOTES
Department of Obstetrics and Gynecology  Labor and Delivery  Post Partum Progress Note      SUBJECTIVE:  27 y.o. yo  PPD # 1 s/p . Pain is controlled. Lochia is light. Tolerating po, ambulating, voiding without difficulty. Breast feeding.     OBJECTIVE:      Vitals:  BP (!) 102/57   Pulse 66   Temp 98.3 °F (36.8 °C) (Oral)   Resp 16   Ht 5' 8\" (1.727 m)   Wt 202 lb (91.6 kg)   LMP 2021   SpO2 98%   Breastfeeding Unknown   BMI 30.71 kg/m²     CONSTITUTIONAL:  awake, alert, cooperative, no apparent distress, and appears stated age  ABDOMEN:  Nontender, fundus firm at U-2  CHEST/BREASTS:  no increased work of breathing  MUSCULOSKELETAL:  nontender legs, trace edema    DATA:    WBC/Hgb/Hct/Plts:  13.9/10.8/32.0/234 ( 07)     ASSESSMENT & PLAN:      Active Problems:    Postpartum care following  delivery    History of COVID, resolved, with no current symptoms        Plan: Routine postpartum care  Discharge to home likely tomorrow    Ruben Santos MD

## 2022-02-12 NOTE — FLOWSHEET NOTE
Dr. Adames Part notified of SVE 8-9. Dr. Adames Part states that he is currently in the building and will be on the unit shortly.

## 2022-02-12 NOTE — PROGRESS NOTES
Pt assisted by 2 staff members to restroom for first time get up. Pt denies dizziness or lightheadedness. Gait steady. Pt unable to void at this time despite effort. RN instructed patient had until 0500 to spontaneously void; hat left in toilet. Pericare done by pt with RN instruction. New ice pack, pad and panties put on pt. Gown changed. Hand hygiene done. Complete linen change done and comfort pad put on bed. Pt tolerated well.

## 2022-02-12 NOTE — PROGRESS NOTES
2316-Patient actively pushing. RN remains in continuous attendance at the bedside. Assessment & evaluation of fetal heart rate ongoing via continuous EFM.    2321-RN remained at bedside throughout pushing. EFM continuously assessed. Vaginal delivery of viable infant. Dr. Pedro Erwin, Dr. Tesha De Los Santos, Chauncey Kelsey. Jasmyn Saravia RN, Martita Younger BSN student, and this RN present for delivery.

## 2022-02-12 NOTE — PROGRESS NOTES
Department of Obstetrics and Gynecology  Labor and Delivery   Resident Progress Note      SUBJECTIVE:  Pt sitting up, post SROM, comforable    OBJECTIVE:    Vitals:    22 1749 22 1833 22 1930 22   BP: 111/67 113/75 (!) 111/59 (!) 109/53   Pulse: 70 71 62 62   Resp: 16 16 16 16   Temp:  97.6 °F (36.4 °C) 98.1 °F (36.7 °C)    TempSrc:  Oral Oral    SpO2:       Weight:       Height:           General appearance: alert,  cooperative   Lungs: No labored breathing or accessory muscle use  Heart: Regular Rate, no visible pulsations   Abdomen: term gravid, NT throughout  Neurologic: Grossly normal    Fetal heart rate:       Baseline Heart Rate:  145        Accelerations:  present       Long Term Variability:  moderate       Decelerations:  absent         Contraction frequency: 5 minutes    Membranes:  Ruptured clear fluid    Cervix:         Dilation:  4 cm         Effacement:  70%         Station:  -2               ASSESSMENT & PLAN:    27 y.o.    OB History        2    Para   1    Term   1            AB        Living   1       SAB        IAB        Ectopic        Molar        Multiple        Live Births   1             39w1d  1. FWB: reassuring  2. Continue current plan.  Pit @ 10 mL, continue to titrate    Abbi , DO  FM PGY-2

## 2022-02-12 NOTE — PLAN OF CARE
Problem: Pain:  Goal: Control of acute pain  Description: Control of acute pain  2/12/2022 1015 by Scott Sharp RN  Outcome: Ongoing  2/12/2022 0520 by Anel Smith RN  Outcome: Ongoing  2/12/2022 0424 by Milind Brown RN  Outcome: Ongoing     Problem: Pain:  Goal: Control of acute pain  Description: Control of acute pain  2/12/2022 1015 by Scott Sharp RN  Outcome: Ongoing  2/12/2022 0520 by Anel Smith RN  Outcome: Ongoing  2/12/2022 0424 by Milind Brown RN  Outcome: Ongoing  Goal: Control of chronic pain  Description: Control of chronic pain  2/12/2022 1015 by Scott Sharp RN  Outcome: Ongoing  2/12/2022 0520 by Anel Smith RN  Outcome: Ongoing     Problem: Falls - Risk of:  Goal: Will remain free from falls  Description: Will remain free from falls  2/12/2022 1015 by Scott Sharp RN  Outcome: Ongoing  2/12/2022 0520 by Anel Smith RN  Outcome: Ongoing  2/12/2022 0424 by Milind Brown RN  Outcome: Ongoing  Goal: Absence of physical injury  Description: Absence of physical injury  2/12/2022 1015 by Scott Sharp RN  Outcome: Ongoing  2/12/2022 0520 by Anel Smith RN  Outcome: Ongoing  2/12/2022 0424 by Milind Brown RN  Outcome: Ongoing     Problem: VAGINAL DELIVERY - RECOVERY AND POST PARTUM  Goal: Vital signs are medically acceptable  2/12/2022 1015 by Scott Sharp RN  Outcome: Ongoing  2/12/2022 0520 by Anel Smith RN  Outcome: Ongoing  2/12/2022 0424 by Milind Brown RN  Outcome: Ongoing  Goal: Patient will remain free of falls  2/12/2022 1015 by Scott Sharp RN  Outcome: Ongoing  2/12/2022 0520 by Anel Smith RN  Outcome: Ongoing  2/12/2022 0424 by Milind Brown RN  Outcome: Ongoing  Goal: Fundus firm at midline  2/12/2022 1015 by Scott Sharp RN  Outcome: Ongoing  2/12/2022 0520 by Anel Smith RN  Outcome: Ongoing  2/12/2022 0424 by Milind Brown RN  Outcome: Ongoing  Goal: Moderate rubra without clots, no purulent discharge, no foul smelling lochia  2/12/2022 1015 by Scott Sharp RN  Outcome: Ongoing  2/12/2022 0520 by Anel Smith RN  Outcome: Ongoing  2/12/2022 0424 by Milind Brown RN  Outcome: Ongoing  Goal: Verbalizes understanding of normal bowel function resumption  2/12/2022 1015 by Scott Sharp RN  Outcome: Ongoing  2/12/2022 0520 by Anel Smith RN  Outcome: Ongoing  2/12/2022 0424 by Milind Brown RN  Outcome: Ongoing  Goal: Edema will be absent or minimal  2/12/2022 1015 by Scott Sharp RN  Outcome: Ongoing  2/12/2022 0520 by Anel Smith RN  Outcome: Ongoing  2/12/2022 0424 by Milind Brown RN  Outcome: Ongoing  Goal: Breasts are soft with nipple integrity intact  2/12/2022 1015 by Scott Sharp RN  Outcome: Ongoing  2/12/2022 0520 by Anel Smith RN  Outcome: Ongoing  2/12/2022 0424 by Milind Brown RN  Outcome: Ongoing  Goal: Demonstrates appropriate breast feeding techniques  2/12/2022 1015 by Scott Sharp RN  Outcome: Ongoing  2/12/2022 0520 by Anel Smith RN  Outcome: Ongoing  2/12/2022 0424 by Milind Brown RN  Outcome: Ongoing  Goal: Appropriate behavior observed  2/12/2022 1015 by Scott Sharp RN  Outcome: Ongoing  2/12/2022 0520 by Anel Smith RN  Outcome: Ongoing  2/12/2022 0424 by Milind Brown RN  Outcome: Ongoing  Goal: Positive Mother-Baby interactions are observed  2/12/2022 1015 by Scott Sharp RN  Outcome: Ongoing  2/12/2022 0520 by Anel Smith RN  Outcome: Ongoing  2/12/2022 0424 by Milind Brown RN  Outcome: Ongoing  Goal: Perineum intact without discharge or hematoma  2/12/2022 1015 by Scott Sharp RN  Outcome: Ongoing  2/12/2022 0520 by Anel Smith RN  Outcome: Ongoing  2/12/2022 0424 by Milind Brown RN  Outcome: Ongoing  Goal: Ambulates independently  2/12/2022 1015 by Scott Sharp RN  Outcome: Ongoing  2/12/2022 0520 by Anel Smith RN  Outcome: Ongoing  2/12/2022 0424 by Milind Brown, RN  Outcome: Ongoing     Problem: PAIN  Goal: Patient's pain/discomfort is manageable  2/12/2022 1015 by Solange Mckinley RN  Outcome: Ongoing  2/12/2022 0520 by Elizabeth Stone RN  Outcome: Ongoing  2/12/2022 0424 by Chrissy Peralta RN  Outcome: Ongoing     Problem: KNOWLEDGE DEFICIT  Goal: Patient/S.O. demonstrates understanding of disease process, treatment plan, medications, and discharge instructions.   2/12/2022 1015 by Solange Mckinley RN  Outcome: Ongoing  2/12/2022 0520 by Elizabeth Stone RN  Outcome: Ongoing  2/12/2022 0424 by Chrissy Peralta RN  Outcome: Ongoing

## 2022-02-12 NOTE — ANESTHESIA POSTPROCEDURE EVALUATION
Department of Anesthesiology  Postprocedure Note    Patient: Larissa Acevedo  MRN: [id number]  YOB: 1995  Date of evaluation: 2/12/2022  Time:  12:15 PM     Procedure Summary     Date: 02/11/22 Room / Location:     Anesthesia Start: 1327 Anesthesia Stop: 2321    Procedure: Labor Analgesia Diagnosis:     Scheduled Providers:  Responsible Provider: Shahla Pinto MD    Anesthesia Type: epidural ASA Status: 3 - Emergent          Anesthesia Type: epidural    Kyara Phase I: Kyara Score: 9    Kyara Phase II: Kyara Score: 10    Last vitals: Reviewed and per EMR flowsheets. Anesthesia Post Evaluation    Level of consciousness: awake  Complications: no  Cardiovascular status: hemodynamically stable  Respiratory status: acceptable  Comments: No apparent complications from neuraxial anesthesia.

## 2022-02-12 NOTE — PLAN OF CARE
Problem: Pain:  Goal: Control of acute pain  Description: Control of acute pain  2/12/2022 0520 by Judy Butts RN  Outcome: Ongoing  2/12/2022 0424 by Constantine Hercules RN  Outcome: Ongoing  Goal: Control of chronic pain  Description: Control of chronic pain  Outcome: Ongoing     Problem: Falls - Risk of:  Goal: Will remain free from falls  Description: Will remain free from falls  2/12/2022 0520 by Judy Butts RN  Outcome: Ongoing  2/12/2022 0424 by Constantine Hercules RN  Outcome: Ongoing  Goal: Absence of physical injury  Description: Absence of physical injury  2/12/2022 0520 by Judy Butts RN  Outcome: Ongoing  2/12/2022 0424 by Constantine Hercules RN  Outcome: Ongoing     Problem: VAGINAL DELIVERY - RECOVERY AND POST PARTUM  Goal: Vital signs are medically acceptable  2/12/2022 0520 by Judy Butts RN  Outcome: Ongoing  2/12/2022 0424 by Constantine Hercules RN  Outcome: Ongoing  Goal: Patient will remain free of falls  2/12/2022 0520 by Judy Butts RN  Outcome: Ongoing  2/12/2022 0424 by Constantine Hercules RN  Outcome: Ongoing  Goal: Fundus firm at midline  2/12/2022 0520 by Judy Butts RN  Outcome: Ongoing  2/12/2022 0424 by Constantine Hercules RN  Outcome: Ongoing  Goal: Moderate rubra without clots, no purulent discharge, no foul smelling lochia  2/12/2022 0520 by Judy Butts RN  Outcome: Ongoing  2/12/2022 0424 by Constantine Hercules RN  Outcome: Ongoing  Goal: Verbalizes understanding of normal bowel function resumption  2/12/2022 0520 by Judy Butts RN  Outcome: Ongoing  2/12/2022 0424 by Constantine Hercules RN  Outcome: Ongoing  Goal: Edema will be absent or minimal  2/12/2022 0520 by Judy Butts RN  Outcome: Ongoing  2/12/2022 0424 by Constantine Hercules RN  Outcome: Ongoing  Goal: Breasts are soft with nipple integrity intact  2/12/2022 0520 by Judy Butts RN  Outcome: Ongoing  2/12/2022 0424 by Constantine Hercules RN  Outcome: Ongoing  Goal: Demonstrates appropriate breast feeding techniques  2/12/2022 0520 by Cindy Kennedy RN  Outcome: Ongoing  2/12/2022 0424 by Bolivar Gamino RN  Outcome: Ongoing  Goal: Appropriate behavior observed  2/12/2022 0520 by Cindy Kennedy RN  Outcome: Ongoing  2/12/2022 0424 by Bolivar Gamino RN  Outcome: Ongoing  Goal: Positive Mother-Baby interactions are observed  2/12/2022 0520 by Cindy Kennedy RN  Outcome: Ongoing  2/12/2022 0424 by Bolivar Gamino RN  Outcome: Ongoing  Goal: Perineum intact without discharge or hematoma  2/12/2022 0520 by Cindy Kennedy RN  Outcome: Ongoing  2/12/2022 0424 by Bolivar Gamino RN  Outcome: Ongoing  Goal: Ambulates independently  2/12/2022 0520 by Cindy Kennedy RN  Outcome: Ongoing  2/12/2022 0424 by Bolivar Gamino RN  Outcome: Ongoing     Problem: PAIN  Goal: Patient's pain/discomfort is manageable  2/12/2022 0520 by Cindy Kennedy RN  Outcome: Ongoing  2/12/2022 0424 by Bolivar Gamino RN  Outcome: Ongoing     Problem: KNOWLEDGE DEFICIT  Goal: Patient/S.O. demonstrates understanding of disease process, treatment plan, medications, and discharge instructions.   2/12/2022 0520 by Cindy Kennedy RN  Outcome: Ongoing  2/12/2022 0424 by Bolivar Gamino RN  Outcome: Ongoing     Problem: Anxiety:  Goal: Level of anxiety will decrease  Description: Level of anxiety will decrease  2/12/2022 0423 by Bolivar Gamino RN  Outcome: Completed     Problem: Breathing Pattern - Ineffective:  Goal: Able to breathe comfortably  Description: Able to breathe comfortably  2/12/2022 0423 by Bolivar Gamino RN  Outcome: Completed     Problem: Fluid Volume - Imbalance:  Goal: Absence of imbalanced fluid volume signs and symptoms  Description: Absence of imbalanced fluid volume signs and symptoms  2/12/2022 0423 by Bolivar Gamino RN  Outcome: Completed  Goal: Absence of intrapartum hemorrhage signs and symptoms  Description: Absence of intrapartum hemorrhage signs and symptoms  2/12/2022 0423 by Bolivar Gamino RN  Outcome: Completed     Problem: Infection - Intrapartum Infection:  Goal: Will show no infection signs and symptoms  Description: Will show no infection signs and symptoms  2022 by Mateo Whitfield RN  Outcome: Completed     Problem: Labor Process - Prolonged:  Goal: Labor progression, first stage, within specified pattern  Description: Labor progression, first stage, within specified pattern  2022 by Mateo Whitfield RN  Outcome: Completed  Goal: Labor progession, second stage, within specified pattern  Description: Labor progession, second stage, within specified pattern  2022 by Mateo Whitfield RN  Outcome: Completed  Goal: Uterine contractions within specified parameters  Description: Uterine contractions within specified parameters  2022 by Mateo Whitfield RN  Outcome: Completed     Problem:  Screening:  Goal: Ability to make informed decisions regarding treatment has improved  Description: Ability to make informed decisions regarding treatment has improved  2022 by Mateo Whitfield RN  Outcome: Completed     Problem: Pain - Acute:  Goal: Pain level will decrease  Description: Pain level will decrease  2022 by Mateo Whitfield RN  Outcome: Completed  Goal: Able to cope with pain  Description: Able to cope with pain  2022 by Mateo Whitfield RN  Outcome: Completed     Problem: Tissue Perfusion - Uteroplacental, Altered:  Goal: Absence of abnormal fetal heart rate pattern  Description: Absence of abnormal fetal heart rate pattern  2022 by Mateo Whitfield RN  Outcome: Completed     Problem: Urinary Retention:  Goal: Experiences of bladder distention will decrease  Description: Experiences of bladder distention will decrease  2022 by Mateo Whitfield RN  Outcome: Completed  Goal: Urinary elimination within specified parameters  Description: Urinary elimination within specified parameters  2/12/2022 0423 by Malini White RN  Outcome: Completed     Problem: Pain:  Goal: Pain level will decrease  Description: Pain level will decrease  2/12/2022 0423 by Malini White RN  Outcome: Completed     Problem: VAGINAL DELIVERY - RECOVERY AND POST PARTUM  Goal: Empties bladder  2/12/2022 0424 by Malini White RN  Outcome: Completed

## 2022-02-12 NOTE — LACTATION NOTE
This note was copied from a baby's chart. Lactation Progress Note      Data:   Initial lactation consult with multip who is breastfeeding for the first time. Infant currently at right breast in cradle hold with flori noted and srs observed. MOB states that infant is breastfeeding well overall, but prefers her right breast. Nipples are intact, no soreness noted. Feeding Method: Feeding Method Used: Breastfeeding  Urine output: x 1  Stool output: x 1  Percent weight change from birth:  0%     Ranjeet Sands is a term SGA female, born by vaginal delivery to a mother with past history of opiate abuse (finished rehab Jan 2021) and Hepatitis C. Mother and infant's urine negative for drugs at delivery. Infant is breast-feeding fairly well and blood sugars have been normal. SGA is asymmetric and is likely related to maternal tobacco use.        Action: Introduced self to patient as Lactation RN, name and phone number written on white board in room. Reinforced good positioning noted and effective deep latch. Stressed importance of infant latching deeply to both sides. Reviewed how to support breast to achieved a deep latch when getting infant onto breast in cradle and cross cradle hold. Instruction to call [facility] with next feeding to left breast to observe infant latch and offer tips as needed. BF education regarding to mob being HEP C +. Importance to stop directly bf'ing if her nipples are cracked and bleeding. Instruction given to pump her breast and discard her milk. Resume direct bf once nipples are healed and not bleeding. Reviewed with mother what to expect over the next  24-48 hours with infant feedings, infant output, and breast care. Educated mother on how to hand express colostrum, and encouraged to do so with sleepy feeding attempts. Reviewed infant feeding cues and encouraged mother to allow infant to breast feed on demand, a minimum of 8-12 times a day after the first day of life.  Also encouraged mother to avoid giving

## 2022-02-12 NOTE — PROGRESS NOTES
Dr. Loida Tran updated on pt. status. SVE 5/70/-2. Pitocin on 12 with category 1 tracing with no early decelerations. Asked  Provider if IUPC can be placed. Provider ok with IUPC placement. Will continue to monitor labor progress.

## 2022-02-13 VITALS
SYSTOLIC BLOOD PRESSURE: 116 MMHG | HEIGHT: 68 IN | BODY MASS INDEX: 30.62 KG/M2 | OXYGEN SATURATION: 98 % | WEIGHT: 202 LBS | HEART RATE: 74 BPM | DIASTOLIC BLOOD PRESSURE: 63 MMHG | RESPIRATION RATE: 16 BRPM | TEMPERATURE: 97.9 F

## 2022-02-13 PROCEDURE — 6370000000 HC RX 637 (ALT 250 FOR IP): Performed by: OBSTETRICS & GYNECOLOGY

## 2022-02-13 RX ORDER — IBUPROFEN 800 MG/1
800 TABLET ORAL 2 TIMES DAILY PRN
Qty: 30 TABLET | Refills: 0 | Status: ON HOLD | OUTPATIENT
Start: 2022-02-13 | End: 2022-03-11

## 2022-02-13 RX ORDER — DOCUSATE SODIUM 100 MG/1
100 CAPSULE, LIQUID FILLED ORAL 2 TIMES DAILY
Qty: 60 CAPSULE | Refills: 0 | Status: ON HOLD | OUTPATIENT
Start: 2022-02-13 | End: 2022-03-11

## 2022-02-13 RX ADMIN — IBUPROFEN 800 MG: 800 TABLET, FILM COATED ORAL at 05:39

## 2022-02-13 RX ADMIN — ACETAMINOPHEN 1000 MG: 500 TABLET ORAL at 00:27

## 2022-02-13 ASSESSMENT — PAIN SCALES - GENERAL
PAINLEVEL_OUTOF10: 3
PAINLEVEL_OUTOF10: 2

## 2022-02-13 NOTE — PLAN OF CARE
Problem: Pain:  Goal: Control of chronic pain  Description: Control of chronic pain  2/12/2022 2237 by Gary Medrano RN  Outcome: Met This Shift  2/12/2022 1015 by Cristina Adan RN  Outcome: Ongoing     Problem: Falls - Risk of:  Goal: Will remain free from falls  Description: Will remain free from falls  2/12/2022 2237 by Gary Medrano RN  Outcome: Met This Shift  2/12/2022 1015 by Cristina Adan RN  Outcome: Ongoing  Goal: Absence of physical injury  Description: Absence of physical injury  2/12/2022 2237 by Gary Medrano RN  Outcome: Met This Shift  2/12/2022 1015 by Cristina Adan RN  Outcome: Ongoing     Problem: VAGINAL DELIVERY - RECOVERY AND POST PARTUM  Goal: Vital signs are medically acceptable  2/12/2022 2237 by Gary Medrano RN  Outcome: Met This Shift  2/12/2022 1015 by Cristina Adan RN  Outcome: Ongoing  Goal: Patient will remain free of falls  2/12/2022 2237 by Gary Medrano RN  Outcome: Met This Shift  2/12/2022 1015 by Cristina Adan RN  Outcome: Ongoing  Goal: Fundus firm at midline  2/12/2022 2237 by Gary Medrano RN  Outcome: Met This Shift  2/12/2022 1015 by Cristina Adan RN  Outcome: Ongoing  Goal: Moderate rubra without clots, no purulent discharge, no foul smelling lochia  2/12/2022 2237 by Gary Medrano RN  Outcome: Met This Shift  2/12/2022 1015 by Cristina Adan RN  Outcome: Ongoing  Goal: Verbalizes understanding of normal bowel function resumption  2/12/2022 2237 by Gary Medrano RN  Outcome: Met This Shift  2/12/2022 1015 by Cristina Adan RN  Outcome: Ongoing  Goal: Edema will be absent or minimal  2/12/2022 2237 by Gary Medrano RN  Outcome: Met This Shift  2/12/2022 1015 by Cristina Adan RN  Outcome: Ongoing  Goal: Appropriate behavior observed  2/12/2022 2237 by Gary Medrano RN  Outcome: Met This Shift  2/12/2022 1015 by Cristina Adan RN  Outcome: Ongoing  Goal: Positive Mother-Baby interactions are observed  2/12/2022 2237 by Kiesha Ornelas RN  Outcome: Met This Shift  2/12/2022 1015 by Ann Belle RN  Outcome: Ongoing  Goal: Ambulates independently  2/12/2022 2237 by Kiesha Ornelas RN  Outcome: Met This Shift  2/12/2022 1015 by Ann Belle RN  Outcome: Ongoing

## 2022-02-13 NOTE — DISCHARGE SUMMARY
Obstetrical Discharge Form    Primary OB Clinician: [address]      EDC: Estimated Date of Delivery: 22    Gestational Age:39w1d    Antepartum complications: intrauterine growth restriction and hep C, tobacco use    Date of Delivery: 2022    Delivered By: Radha Potts MD     Delivery Type: spontaneous vaginal delivery    Baby: Liveborn female, Apgars 8/9, weight 2665gm, 5 #, 14 oz,     Anesthesia: Epidural    Intrapartum complications: None    Laceration: 1st degree    Feeding method: breast    Rh Immune globulin given: not applicable    Rubella vaccine given: not applicable    Discharge Date: 2022;    Condition:  stable    Plan:     Follow-up appointment with Dr. Katie Weber in 4 weeks. Department of Obstetrics and Gynecology  Labor and Delivery  Post Partum Progress Note      SUBJECTIVE:  27 y.o. yo  PPD # 2 s/p . Pain is controlled. Lochia is light. Tolerating po, ambulating, voiding without difficulty.     OBJECTIVE:      Vitals:  /63   Pulse 74   Temp 97.9 °F (36.6 °C) (Oral)   Resp 16   Ht 5' 8\" (1.727 m)   Wt 202 lb (91.6 kg)   LMP 2021   SpO2 98%   Breastfeeding Unknown   BMI 30.71 kg/m²     CONSTITUTIONAL:  awake, alert, cooperative, no apparent distress, and appears stated age  ABDOMEN:  Nontender, fundus firm @ U-2  CHEST/BREASTS:  no increased work of breathing  MUSCULOSKELETAL:  nontender legs, trace edema    DATA:    WBC/Hgb/Hct/Plts:  13.9/10.8/32.0/234 (721)     ASSESSMENT & PLAN:      Active Problems:    Postpartum care following  delivery    Plan: Routine postpartum care  Discharge to home today

## 2022-02-13 NOTE — PROGRESS NOTES
Patients PPD screening score is a 10. Dr. Scarlet Solares aware. Patients sees a MD for anxiety and depression. Patient plans to see MD to get back on medications.

## 2022-02-13 NOTE — PROGRESS NOTES
Discharge Phone Call    Patient Name: [facility] Care Provider: Coby Ravi DO Discharge Date: 2022    Disposition of baby:    Phone Number: [phone] (home)     Attempts to Contact:  Date:    Caller  Date:    Caller  Date:    Caller    Information for the patient's :  Sonia Jolly Girl Lillian [[id number]]   Delivery Method: Vaginal, Spontaneous       1. Now that you are at home is your pain being well controlled? Y/N   If no, instruct to call       provider. 2. Are you breastfeeding? Y/N    Do you need any extra support from our lactation staff? Y/N    If yes, provide number for lactation. 3. Have you made or already had your first appointment with the baby's doctor? Y/N   If no, do      you know when to schedule it? Y/N    4. Have you scheduled your follow-up appointment? Y/N  If no, do you know when to schedule       it? Y/N   If no, they can find it on printed discharge instructions. 5. Did staff discuss safe sleep during your stay? Y/N   6. Did we explain things in a way you could understand? Y/N  7. Were we respectful of your preferences for labor and birth and include you in the plan of       care? Y/N  If no, please explain _______________________________________________  8. Is there anyone in particular you would like to mention who provided care for you? _______      _________________________________________________________________________     9. Were you given a Post-Birth Warning Signs handout? Y/N  Do you have it somewhere      easily accessible? Y/N  If no, please send them a copy and ask them to put it somewhere      easily found. 10. Have you been crying excessively, having anger or mood swings that feel out of control, or       feel like you can't cope with caring for yourself or baby? Y/N   If yes, they may be showing       signs of postpartum depression and should call provider.  There is also a        depression test on page C5 in their discharge booklet they can take. 13. Do you have any other questions or concerns I can address today?  Y/N  ______________      _________________________________________________________________________    Information provided during call :_________________________________________________  ___________________________________________________________________________    Call completed by:____________________________    Date:_________ Time:___________

## 2022-03-11 ENCOUNTER — APPOINTMENT (OUTPATIENT)
Dept: GENERAL RADIOLOGY | Age: 27
DRG: 561 | End: 2022-03-11
Payer: MEDICAID

## 2022-03-11 ENCOUNTER — APPOINTMENT (OUTPATIENT)
Dept: ULTRASOUND IMAGING | Age: 27
DRG: 561 | End: 2022-03-11
Payer: MEDICAID

## 2022-03-11 ENCOUNTER — HOSPITAL ENCOUNTER (INPATIENT)
Age: 27
LOS: 3 days | Discharge: HOME | DRG: 561 | End: 2022-03-14
Attending: STUDENT IN AN ORGANIZED HEALTH CARE EDUCATION/TRAINING PROGRAM | Admitting: INTERNAL MEDICINE
Payer: MEDICAID

## 2022-03-11 DIAGNOSIS — N61.1 BREAST ABSCESS OF FEMALE: Primary | ICD-10-CM

## 2022-03-11 DIAGNOSIS — Z78.9 INFANT NOT EXCLUSIVELY BREASTFED: ICD-10-CM

## 2022-03-11 LAB
A/G RATIO: 1.4 (ref 1.1–2.2)
ALBUMIN SERPL-MCNC: 4.5 G/DL (ref 3.4–5)
ALP BLD-CCNC: 122 U/L (ref 40–129)
ALT SERPL-CCNC: 26 U/L (ref 10–40)
ANION GAP SERPL CALCULATED.3IONS-SCNC: 13 MMOL/L (ref 3–16)
AST SERPL-CCNC: 21 U/L (ref 15–37)
BASOPHILS ABSOLUTE: 0 K/UL (ref 0–0.2)
BASOPHILS RELATIVE PERCENT: 0.3 %
BILIRUB SERPL-MCNC: <0.2 MG/DL (ref 0–1)
BILIRUBIN URINE: NEGATIVE
BLOOD, URINE: NEGATIVE
BUN BLDV-MCNC: 13 MG/DL (ref 7–20)
CALCIUM SERPL-MCNC: 9.2 MG/DL (ref 8.3–10.6)
CHLORIDE BLD-SCNC: 100 MMOL/L (ref 99–110)
CLARITY: ABNORMAL
CO2: 25 MMOL/L (ref 21–32)
COLOR: ABNORMAL
CREAT SERPL-MCNC: 0.8 MG/DL (ref 0.6–1.1)
EOSINOPHILS ABSOLUTE: 0.7 K/UL (ref 0–0.6)
EOSINOPHILS RELATIVE PERCENT: 6.4 %
GFR AFRICAN AMERICAN: >60
GFR NON-AFRICAN AMERICAN: >60
GLUCOSE BLD-MCNC: 101 MG/DL (ref 70–99)
GLUCOSE URINE: NEGATIVE MG/DL
HCG QUALITATIVE: NEGATIVE
HCT VFR BLD CALC: 37.9 % (ref 36–48)
HEMOGLOBIN: 12.6 G/DL (ref 12–16)
KETONES, URINE: NEGATIVE MG/DL
LACTIC ACID, SEPSIS: 0.8 MMOL/L (ref 0.4–1.9)
LEUKOCYTE ESTERASE, URINE: NEGATIVE
LYMPHOCYTES ABSOLUTE: 2 K/UL (ref 1–5.1)
LYMPHOCYTES RELATIVE PERCENT: 17.8 %
MCH RBC QN AUTO: 30.5 PG (ref 26–34)
MCHC RBC AUTO-ENTMCNC: 33.2 G/DL (ref 31–36)
MCV RBC AUTO: 92 FL (ref 80–100)
MICROSCOPIC EXAMINATION: ABNORMAL
MONOCYTES ABSOLUTE: 0.6 K/UL (ref 0–1.3)
MONOCYTES RELATIVE PERCENT: 5.8 %
NEUTROPHILS ABSOLUTE: 7.8 K/UL (ref 1.7–7.7)
NEUTROPHILS RELATIVE PERCENT: 69.7 %
NITRITE, URINE: NEGATIVE
PDW BLD-RTO: 13.1 % (ref 12.4–15.4)
PH UA: 6 (ref 5–8)
PLATELET # BLD: 414 K/UL (ref 135–450)
PMV BLD AUTO: 8 FL (ref 5–10.5)
POTASSIUM REFLEX MAGNESIUM: 4.4 MMOL/L (ref 3.5–5.1)
PROTEIN UA: NEGATIVE MG/DL
RBC # BLD: 4.12 M/UL (ref 4–5.2)
SARS-COV-2, NAAT: NOT DETECTED
SODIUM BLD-SCNC: 138 MMOL/L (ref 136–145)
SPECIFIC GRAVITY UA: >=1.03 (ref 1–1.03)
TOTAL PROTEIN: 7.7 G/DL (ref 6.4–8.2)
URINE REFLEX TO CULTURE: ABNORMAL
URINE TYPE: ABNORMAL
UROBILINOGEN, URINE: 0.2 E.U./DL
WBC # BLD: 11.2 K/UL (ref 4–11)

## 2022-03-11 PROCEDURE — 87040 BLOOD CULTURE FOR BACTERIA: CPT

## 2022-03-11 PROCEDURE — 76999 ECHO EXAMINATION PROCEDURE: CPT

## 2022-03-11 PROCEDURE — 96375 TX/PRO/DX INJ NEW DRUG ADDON: CPT

## 2022-03-11 PROCEDURE — 84703 CHORIONIC GONADOTROPIN ASSAY: CPT

## 2022-03-11 PROCEDURE — 87635 SARS-COV-2 COVID-19 AMP PRB: CPT

## 2022-03-11 PROCEDURE — 85025 COMPLETE CBC W/AUTO DIFF WBC: CPT

## 2022-03-11 PROCEDURE — 6360000002 HC RX W HCPCS: Performed by: INTERNAL MEDICINE

## 2022-03-11 PROCEDURE — 2580000003 HC RX 258: Performed by: INTERNAL MEDICINE

## 2022-03-11 PROCEDURE — 71045 X-RAY EXAM CHEST 1 VIEW: CPT

## 2022-03-11 PROCEDURE — 80053 COMPREHEN METABOLIC PANEL: CPT

## 2022-03-11 PROCEDURE — 6370000000 HC RX 637 (ALT 250 FOR IP): Performed by: INTERNAL MEDICINE

## 2022-03-11 PROCEDURE — 1200000000 HC SEMI PRIVATE

## 2022-03-11 PROCEDURE — 83605 ASSAY OF LACTIC ACID: CPT

## 2022-03-11 PROCEDURE — 81003 URINALYSIS AUTO W/O SCOPE: CPT

## 2022-03-11 PROCEDURE — 96365 THER/PROPH/DIAG IV INF INIT: CPT

## 2022-03-11 PROCEDURE — 99284 EMERGENCY DEPT VISIT MOD MDM: CPT

## 2022-03-11 PROCEDURE — 2500000003 HC RX 250 WO HCPCS: Performed by: PHYSICIAN ASSISTANT

## 2022-03-11 PROCEDURE — 6360000002 HC RX W HCPCS: Performed by: PHYSICIAN ASSISTANT

## 2022-03-11 RX ORDER — SODIUM CHLORIDE 0.9 % (FLUSH) 0.9 %
10 SYRINGE (ML) INJECTION PRN
Status: DISCONTINUED | OUTPATIENT
Start: 2022-03-11 | End: 2022-03-14 | Stop reason: HOSPADM

## 2022-03-11 RX ORDER — SODIUM CHLORIDE 0.9 % (FLUSH) 0.9 %
10 SYRINGE (ML) INJECTION EVERY 12 HOURS SCHEDULED
Status: DISCONTINUED | OUTPATIENT
Start: 2022-03-11 | End: 2022-03-14 | Stop reason: HOSPADM

## 2022-03-11 RX ORDER — ACETAMINOPHEN 325 MG/1
650 TABLET ORAL EVERY 6 HOURS PRN
COMMUNITY

## 2022-03-11 RX ORDER — PROMETHAZINE HYDROCHLORIDE 25 MG/1
12.5 TABLET ORAL EVERY 6 HOURS PRN
Status: DISCONTINUED | OUTPATIENT
Start: 2022-03-11 | End: 2022-03-14 | Stop reason: HOSPADM

## 2022-03-11 RX ORDER — FENTANYL CITRATE 50 UG/ML
25 INJECTION, SOLUTION INTRAMUSCULAR; INTRAVENOUS
Status: COMPLETED | OUTPATIENT
Start: 2022-03-11 | End: 2022-03-12

## 2022-03-11 RX ORDER — POTASSIUM CHLORIDE 7.45 MG/ML
10 INJECTION INTRAVENOUS PRN
Status: DISCONTINUED | OUTPATIENT
Start: 2022-03-11 | End: 2022-03-14 | Stop reason: HOSPADM

## 2022-03-11 RX ORDER — ONDANSETRON 2 MG/ML
4 INJECTION INTRAMUSCULAR; INTRAVENOUS EVERY 6 HOURS PRN
Status: DISCONTINUED | OUTPATIENT
Start: 2022-03-11 | End: 2022-03-14 | Stop reason: HOSPADM

## 2022-03-11 RX ORDER — ACETAMINOPHEN 325 MG/1
650 TABLET ORAL EVERY 6 HOURS PRN
Status: DISCONTINUED | OUTPATIENT
Start: 2022-03-11 | End: 2022-03-14 | Stop reason: HOSPADM

## 2022-03-11 RX ORDER — ACETAMINOPHEN 650 MG/1
650 SUPPOSITORY RECTAL EVERY 6 HOURS PRN
Status: DISCONTINUED | OUTPATIENT
Start: 2022-03-11 | End: 2022-03-14 | Stop reason: HOSPADM

## 2022-03-11 RX ORDER — KETOROLAC TROMETHAMINE 30 MG/ML
15 INJECTION, SOLUTION INTRAMUSCULAR; INTRAVENOUS ONCE
Status: COMPLETED | OUTPATIENT
Start: 2022-03-11 | End: 2022-03-11

## 2022-03-11 RX ORDER — ALBUTEROL SULFATE 90 UG/1
2 AEROSOL, METERED RESPIRATORY (INHALATION) EVERY 4 HOURS PRN
Status: DISCONTINUED | OUTPATIENT
Start: 2022-03-11 | End: 2022-03-14 | Stop reason: HOSPADM

## 2022-03-11 RX ORDER — OXYCODONE HYDROCHLORIDE 5 MG/1
10 TABLET ORAL EVERY 4 HOURS PRN
Status: DISCONTINUED | OUTPATIENT
Start: 2022-03-11 | End: 2022-03-11

## 2022-03-11 RX ORDER — CLINDAMYCIN PHOSPHATE 600 MG/50ML
600 INJECTION INTRAVENOUS ONCE
Status: COMPLETED | OUTPATIENT
Start: 2022-03-11 | End: 2022-03-11

## 2022-03-11 RX ORDER — OXYCODONE HYDROCHLORIDE 5 MG/1
5 TABLET ORAL EVERY 4 HOURS PRN
Status: DISCONTINUED | OUTPATIENT
Start: 2022-03-11 | End: 2022-03-11

## 2022-03-11 RX ORDER — SODIUM CHLORIDE 9 MG/ML
25 INJECTION, SOLUTION INTRAVENOUS PRN
Status: DISCONTINUED | OUTPATIENT
Start: 2022-03-11 | End: 2022-03-14 | Stop reason: HOSPADM

## 2022-03-11 RX ORDER — MAGNESIUM SULFATE IN WATER 40 MG/ML
2000 INJECTION, SOLUTION INTRAVENOUS PRN
Status: DISCONTINUED | OUTPATIENT
Start: 2022-03-11 | End: 2022-03-14 | Stop reason: HOSPADM

## 2022-03-11 RX ADMIN — CLINDAMYCIN PHOSPHATE 600 MG: 600 INJECTION, SOLUTION INTRAVENOUS at 17:01

## 2022-03-11 RX ADMIN — FENTANYL CITRATE 25 MCG: 50 INJECTION, SOLUTION INTRAMUSCULAR; INTRAVENOUS at 20:51

## 2022-03-11 RX ADMIN — KETOROLAC TROMETHAMINE 15 MG: 30 INJECTION, SOLUTION INTRAMUSCULAR at 19:10

## 2022-03-11 RX ADMIN — SODIUM CHLORIDE, PRESERVATIVE FREE 10 ML: 5 INJECTION INTRAVENOUS at 23:45

## 2022-03-11 RX ADMIN — VANCOMYCIN HYDROCHLORIDE 1250 MG: 10 INJECTION, POWDER, LYOPHILIZED, FOR SOLUTION INTRAVENOUS at 23:48

## 2022-03-11 RX ADMIN — FENTANYL CITRATE 25 MCG: 50 INJECTION, SOLUTION INTRAMUSCULAR; INTRAVENOUS at 23:45

## 2022-03-11 ASSESSMENT — PAIN DESCRIPTION - PAIN TYPE: TYPE: ACUTE PAIN

## 2022-03-11 ASSESSMENT — ENCOUNTER SYMPTOMS
EYE DISCHARGE: 0
VOMITING: 0
ABDOMINAL PAIN: 0
CHEST TIGHTNESS: 0
SINUS PAIN: 0
DIARRHEA: 0
CONSTIPATION: 0
EYE REDNESS: 0
SHORTNESS OF BREATH: 0
NAUSEA: 0
RHINORRHEA: 0
SINUS PRESSURE: 0
COUGH: 0
SORE THROAT: 0

## 2022-03-11 ASSESSMENT — PAIN DESCRIPTION - ONSET: ONSET: GRADUAL

## 2022-03-11 ASSESSMENT — PAIN SCALES - GENERAL
PAINLEVEL_OUTOF10: 8
PAINLEVEL_OUTOF10: 8
PAINLEVEL_OUTOF10: 6
PAINLEVEL_OUTOF10: 8

## 2022-03-11 ASSESSMENT — PAIN - FUNCTIONAL ASSESSMENT
PAIN_FUNCTIONAL_ASSESSMENT: ACTIVITIES ARE NOT PREVENTED
PAIN_FUNCTIONAL_ASSESSMENT: 0-10
PAIN_FUNCTIONAL_ASSESSMENT: 0-10

## 2022-03-11 ASSESSMENT — PAIN DESCRIPTION - FREQUENCY: FREQUENCY: INTERMITTENT

## 2022-03-11 ASSESSMENT — PAIN DESCRIPTION - LOCATION: LOCATION: BREAST

## 2022-03-11 ASSESSMENT — PAIN DESCRIPTION - PROGRESSION: CLINICAL_PROGRESSION: GRADUALLY IMPROVING

## 2022-03-11 ASSESSMENT — PAIN DESCRIPTION - ORIENTATION: ORIENTATION: LEFT

## 2022-03-11 NOTE — ED PROVIDER NOTES
[facility]  ED  EMERGENCY DEPARTMENT ENCOUNTER        Pt Name: Amanda Ontiveros  MRN: [id number]  [place] 1995  Date of evaluation: 3/11/2022  Provider: Doroteo Borrego PA-C  PCP: No primary care provider on file. ED Attending:VALENCIA Tarango       This patient was seen and evaluated by the attending physician. I have not independently evaluated this patient. CHIEF COMPLAINT       Chief Complaint   Patient presents with    Abscess     pt had mastitis on left breast and treated with ABX, didnt go away and pt went to OB, they said it has turned into an abscess and pt was told to come to ED       HISTORY OF PRESENT ILLNESS   (Location/Symptom, Timing/Onset, Context/Setting, Quality, Duration, Modifying Factors, Severity)  Note limiting factors. Amanda Ontiveros is a 27 y.o. female presents via private vehicle, sent from Dr. Jamal Son office for abscess to breast, taken 10 d course of abx, fevers up to 104.0 degrees. 8/10 pain to left breast, some drainage  +breastfeeding, difficulty with milk supply to left side. Still pumping every 3 hours, getting 1.5 oz approximately. 500mg q 12 hours, dicloxacillin, was taken for 10 days. 1 month post partum     Nursing Notes were all reviewed and agreed with or any disagreements were addressed  in the HPI. REVIEW OF SYSTEMS  (2-9 systems for level 4, 10 or more for level 5)     Review of Systems   Constitutional: Negative for chills and fever. HENT: Negative. Negative for congestion, rhinorrhea, sinus pressure, sinus pain and sore throat. Eyes: Negative for discharge, redness and visual disturbance. Respiratory: Negative for cough, chest tightness and shortness of breath. Cardiovascular: Negative for chest pain and palpitations. Gastrointestinal: Negative for abdominal pain, constipation, diarrhea, nausea and vomiting. Genitourinary: Negative for difficulty urinating, dysuria and frequency. Musculoskeletal: Negative. Skin: Positive for wound. Neurological: Negative. Negative for dizziness, weakness, numbness and headaches. Psychiatric/Behavioral: Negative. All other systems reviewed and are negative. Positivesand Pertinent negatives as per HPI. Except as noted above in the ROS, all other systems were reviewed and negative. PAST MEDICAL HISTORY     Past Medical History:   Diagnosis Date    Anxiety     Bipolar 2 disorder ([facility]     Stopped medications with +UPT    Depression     H/O seasonal allergies     Hepatitis C     confirmed 8/29 per prenatal    Heroin abuse ([facility]     last used 9/28/20- inpatient rehab. Finished outpatient rehab 4/2021         SURGICAL HISTORY       Past Surgical History:   Procedure Laterality Date    LIP REPAIR      WISDOM TOOTH EXTRACTION           CURRENT MEDICATIONS       Previous Medications    ALBUTEROL SULFATE  (90 BASE) MCG/ACT INHALER    Inhale 2 puffs into the lungs every 4 hours as needed    DOCUSATE SODIUM (COLACE) 100 MG CAPSULE    Take 1 capsule by mouth 2 times daily    DOCUSATE SODIUM (COLACE) 100 MG CAPSULE    Take 1 capsule by mouth 2 times daily    IBUPROFEN (ADVIL;MOTRIN) 800 MG TABLET    Take 1 tablet by mouth every 8 hours as needed for Pain    IBUPROFEN (ADVIL;MOTRIN) 800 MG TABLET    Take 1 tablet by mouth 2 times daily as needed for Pain         ALLERGIES     Patient has no known allergies. FAMILY HISTORY     History reviewed. No pertinent family history.       SOCIAL HISTORY       Social History     Socioeconomic History    Marital status: Single     Spouse name: None    Number of children: None    Years of education: None    Highest education level: None   Occupational History    None   Tobacco Use    Smoking status: Current Every Day Smoker     Packs/day: 0.25     Years: 4.00     Pack years: 1.00    Smokeless tobacco: Never Used   Substance and Sexual Activity    Alcohol use: No     Alcohol/week: 1.0 standard drink     Types: 1 Glasses of wine per week    Drug use: No    Sexual activity: Yes     Partners: Male   Other Topics Concern    None   Social History Narrative    None     Social Determinants of Health     Financial Resource Strain:     Difficulty of Paying Living Expenses: Not on file   Food Insecurity:     Worried About Running Out of Food in the Last Year: Not on file    Shane of Food in the Last Year: Not on file   Transportation Needs:     Lack of Transportation (Medical): Not on file    Lack of Transportation (Non-Medical): Not on file   Physical Activity:     Days of Exercise per Week: Not on file    Minutes of Exercise per Session: Not on file   Stress:     Feeling of Stress : Not on file   Social Connections:     Frequency of Communication with Friends and Family: Not on file    Frequency of Social Gatherings with Friends and Family: Not on file    Attends Orthodoxy Services: Not on file    Active Member of [address] [place] [address] [employer] or Organizations: Not on file    Attends Club or Organization Meetings: Not on file    Marital Status: Not on file   Intimate Partner Violence:     Fear of Current or Ex-Partner: Not on file    Emotionally Abused: Not on file    Physically Abused: Not on file    Sexually Abused: Not on file   Housing Stability:     Unable to Pay for Housing in the Last Year: Not on file    Number of [place] in the Last Year: Not on file    Unstable Housing in the Last Year: Not on file       SCREENINGS     NIH Score       [facility] Lynsey Coma Scale  Eye Opening: Spontaneous  Best Verbal Response: Oriented  Best Motor Response: Obeys commands  Lynsey Coma Scale Score: 15    [facility]     Heart Score         PHYSICAL EXAM    (up to 7 for level 4, 8 ormore for level 5)     ED Triage Vitals [03/11/22 1557]   BP Temp Temp Source Pulse Resp SpO2 Height Weight   126/89 97.7 °F (36.5 °C) Oral 90 16 97 % 5' 7\" (1.702 m) 183 lb (83 kg)       Physical Exam  Vitals and nursing note reviewed.    Constitutional: Appearance: She is well-developed. She is not diaphoretic. HENT:      Head: Normocephalic. Nose: Nose normal.      Mouth/Throat:      Pharynx: No oropharyngeal exudate. Eyes:      General:         Right eye: No discharge. Left eye: No discharge. Conjunctiva/sclera: Conjunctivae normal.      Pupils: Pupils are equal, round, and reactive to light. Cardiovascular:      Rate and Rhythm: Normal rate and regular rhythm. Heart sounds: Normal heart sounds. No murmur heard. No friction rub. No gallop. Pulmonary:      Effort: Pulmonary effort is normal. No respiratory distress. Breath sounds: Normal breath sounds. No wheezing. Chest:       Abdominal:      General: Bowel sounds are normal. There is no distension. Palpations: Abdomen is soft. Tenderness: There is no abdominal tenderness. Musculoskeletal:         General: Normal range of motion. Cervical back: Normal range of motion. Skin:     General: Skin is warm and dry. Neurological:      General: No focal deficit present. Mental Status: She is alert and oriented to person, place, and time.    Psychiatric:         Behavior: Behavior normal.                   DIAGNOSTIC RESULTS   LABS:    Labs Reviewed   CBC WITH AUTO DIFFERENTIAL - Abnormal; Notable for the following components:       Result Value    WBC 11.2 (*)     Neutrophils Absolute 7.8 (*)     Eosinophils Absolute 0.7 (*)     All other components within normal limits   COMPREHENSIVE METABOLIC PANEL W/ REFLEX TO MG FOR LOW K - Abnormal; Notable for the following components:    Glucose 101 (*)     All other components within normal limits   URINALYSIS WITH REFLEX TO CULTURE - Abnormal; Notable for the following components:    Color, UA DARK YELLOW (*)     Clarity, UA SL CLOUDY (*)     All other components within normal limits   COVID-19, RAPID   CULTURE, BLOOD 1   CULTURE, BLOOD 2   LACTATE, SEPSIS   HCG, SERUM, QUALITATIVE       All other labs were within normal range or notreturned as of this dictation. RADIOLOGY:     Interpretation per the Radiologist below, if available at the time of this note:    US SOFT TISSUE LIMITED AREA   Final Result   Inhomogeneous 4.1 cm collection in the 2 o'clock position of the left breast,   consistent with abscess. Neoplasm is less likely. Continued surveillance is   recommended. RECOMMENDATION:   Follow-up ultrasound is recommended in 3 months. XR CHEST PORTABLE   Final Result   No airspace disease by radiograph given suboptimal evaluation of the lung   bases due to overlying soft tissue. Catia Brittle CRITICAL CARE TIME   Total critical care time today provided was 38 minutes. This excludes seperately billable procedures and family discussion time. Provided for abscess requiring intervention, re-evaluation with concern for potential decompensation. CONSULTS:  Hospitalist,     EMERGENCY DEPARTMENT COURSE and DIFFERENTIAL DIAGNOSIS/MDM:   Vitals:    Vitals:    03/11/22 1557 03/11/22 1851   BP: 126/89 119/76   Pulse: 90 77   Resp: 16 18   Temp: 97.7 °F (36.5 °C)    TempSrc: Oral    SpO2: 97% 97%   Weight: 183 lb (83 kg)    Height: 5' 7\" (1.702 m)        Patient was given the following medications:  Medications   clindamycin (CLEOCIN) 600 mg in dextrose 5 % 50 mL IVPB (0 mg IntraVENous Stopped 3/11/22 1738)   ketorolac (TORADOL) injection 15 mg (15 mg IntraVENous Given 3/11/22 1910)         Afebrile, stable, patient presents to the ED for evaluation. Seen in conjunction with attending ED provider who agrees with assessment and plan. Patients SPO2 is 97% on room air they are not hypoxic. ED Course as of 03/11/22 1940   Fri Mar 11, 2022   1750 Labs returned revealing mild leukocytosis, blood cell count 11.2. No evidence of anemia no electrolyte abnormalities. Negative pregnancy. Negative COVID. [AR]      ED Course User Index  [AR] Rush Metzger PA-C      U/S shows area of abscess involvement. As pt has failed out patient treatment, feels she would benefit from admission for abx, surgical evaluation and pain management. All questions are answered. The patient tolerated their visit well. They were seen and evaluated by the attendingphysician, No att. providers found who agreed with the assessment and plan. The patient and / or the family were informed of the results of any tests, a time was given to answer questions, a plan was proposed and they agreed Bi Banda. FINAL IMPRESSION      1. Breast abscess of female    2. Infant not exclusively           DISPOSITION/PLAN   DISPOSITION    Admit in stable condition     PATIENT REFERRED TO:  No follow-up provider specified. DISCHARGE MEDICATIONS:  New Prescriptions    No medications on file       DISCONTINUED MEDICATIONS:  Discontinued Medications    No medications on file              Pt was seen during the [place] 19 pandemic. Appropriate PPE worn by ME during patient encounters. Pt seen during a time with constrained hospital bed capacity and other potential inpatient and outpatient resources were constrained due to the viral pandemic.    Please note that portions of this note were completed with a voice recognition program.  Efforts were made to edit the dictations but occasionally words are mis-transcribed.)    Karissa Spnece PA-C (electronically signed)        Karissa Spence PA-C  03/11/22 7340

## 2022-03-11 NOTE — ED PROVIDER NOTES
I independently examined and evaluated Lillian De La Cruz. In brief, patient is a 57-year-old female, presenting with concerns for breast abscess. Patient was sent in by PCP with concerns for abscess of the left breast, was recently treated for mastitis with dicloxacillin. She is currently breast-feeding. Labs Reviewed   CBC WITH AUTO DIFFERENTIAL - Abnormal; Notable for the following components:       Result Value    WBC 11.2 (*)     Neutrophils Absolute 7.8 (*)     Eosinophils Absolute 0.7 (*)     All other components within normal limits   COMPREHENSIVE METABOLIC PANEL W/ REFLEX TO MG FOR LOW K - Abnormal; Notable for the following components:    Glucose 101 (*)     All other components within normal limits   URINALYSIS WITH REFLEX TO CULTURE - Abnormal; Notable for the following components:    Color, UA DARK YELLOW (*)     Clarity, UA SL CLOUDY (*)     All other components within normal limits   COVID-19, RAPID   CULTURE, BLOOD 1   CULTURE, BLOOD 2   LACTATE, SEPSIS   HCG, SERUM, QUALITATIVE   COMPREHENSIVE METABOLIC PANEL W/ REFLEX TO MG FOR LOW K   CBC WITH AUTO DIFFERENTIAL     US SOFT TISSUE LIMITED AREA   Final Result   Inhomogeneous 4.1 cm collection in the 2 o'clock position of the left breast,   consistent with abscess. Neoplasm is less likely. Continued surveillance is   recommended. RECOMMENDATION:   Follow-up ultrasound is recommended in 3 months. XR CHEST PORTABLE   Final Result   No airspace disease by radiograph given suboptimal evaluation of the lung   bases due to overlying soft tissue. .           ED course: Patient presents with concerns for left breast abscess. Patient seen in conjunction with GINNA Vallecillo, please refer to her note for further details of the patient's work-up and treatment. Ultrasound revealed a 4.1 cm collection in the 2 o'clock position of the left breast consistent with abscess. Patient started on antibiotics.   Will be hospitalized for further work-up and treatment of her condition. All diagnostic, treatment, and disposition decisions were made by myself in conjunction with the advanced practice provider. For all further details of the patient's emergency department visit, please see the advanced practice provider's documentation. 1. Breast abscess of female    2. Infant not exclusively           Comment: Please note this report has been produced using speech recognition software and may contain errors related to that system including errors in grammar, punctuation, and spelling, as well as words and phrases that may be inappropriate. If there are any questions or concerns please feel free to contact the dictating provider for clarification.        Jet Villasenor MD  03/11/22 7158

## 2022-03-12 ENCOUNTER — ANESTHESIA EVENT (OUTPATIENT)
Dept: OPERATING ROOM | Age: 27
DRG: 561 | End: 2022-03-12
Payer: MEDICAID

## 2022-03-12 ENCOUNTER — ANESTHESIA (OUTPATIENT)
Dept: OPERATING ROOM | Age: 27
DRG: 561 | End: 2022-03-12
Payer: MEDICAID

## 2022-03-12 VITALS
SYSTOLIC BLOOD PRESSURE: 82 MMHG | DIASTOLIC BLOOD PRESSURE: 49 MMHG | OXYGEN SATURATION: 98 % | RESPIRATION RATE: 6 BRPM

## 2022-03-12 LAB
BASOPHILS ABSOLUTE: 0 K/UL (ref 0–0.2)
BASOPHILS RELATIVE PERCENT: 0.4 %
EOSINOPHILS ABSOLUTE: 0.7 K/UL (ref 0–0.6)
EOSINOPHILS RELATIVE PERCENT: 7.2 %
HCT VFR BLD CALC: 36.9 % (ref 36–48)
HEMOGLOBIN: 12.1 G/DL (ref 12–16)
LYMPHOCYTES ABSOLUTE: 2.3 K/UL (ref 1–5.1)
LYMPHOCYTES RELATIVE PERCENT: 25.5 %
MCH RBC QN AUTO: 30.4 PG (ref 26–34)
MCHC RBC AUTO-ENTMCNC: 32.7 G/DL (ref 31–36)
MCV RBC AUTO: 92.7 FL (ref 80–100)
MONOCYTES ABSOLUTE: 0.5 K/UL (ref 0–1.3)
MONOCYTES RELATIVE PERCENT: 5.9 %
NEUTROPHILS ABSOLUTE: 5.6 K/UL (ref 1.7–7.7)
NEUTROPHILS RELATIVE PERCENT: 61 %
PDW BLD-RTO: 13 % (ref 12.4–15.4)
PLATELET # BLD: 353 K/UL (ref 135–450)
PMV BLD AUTO: 8.1 FL (ref 5–10.5)
RBC # BLD: 3.97 M/UL (ref 4–5.2)
VANCOMYCIN RANDOM: 14.6 UG/ML
WBC # BLD: 9.2 K/UL (ref 4–11)

## 2022-03-12 PROCEDURE — 3700000000 HC ANESTHESIA ATTENDED CARE: Performed by: SURGERY

## 2022-03-12 PROCEDURE — 87186 SC STD MICRODIL/AGAR DIL: CPT

## 2022-03-12 PROCEDURE — 6370000000 HC RX 637 (ALT 250 FOR IP): Performed by: INTERNAL MEDICINE

## 2022-03-12 PROCEDURE — 6360000002 HC RX W HCPCS: Performed by: NURSE PRACTITIONER

## 2022-03-12 PROCEDURE — 2580000003 HC RX 258: Performed by: INTERNAL MEDICINE

## 2022-03-12 PROCEDURE — 0H9U0ZZ DRAINAGE OF LEFT BREAST, OPEN APPROACH: ICD-10-PCS | Performed by: SURGERY

## 2022-03-12 PROCEDURE — 7100000001 HC PACU RECOVERY - ADDTL 15 MIN: Performed by: SURGERY

## 2022-03-12 PROCEDURE — 2580000003 HC RX 258: Performed by: SURGERY

## 2022-03-12 PROCEDURE — 6360000002 HC RX W HCPCS: Performed by: INTERNAL MEDICINE

## 2022-03-12 PROCEDURE — 80202 ASSAY OF VANCOMYCIN: CPT

## 2022-03-12 PROCEDURE — 3700000001 HC ADD 15 MINUTES (ANESTHESIA): Performed by: SURGERY

## 2022-03-12 PROCEDURE — 3600000002 HC SURGERY LEVEL 2 BASE: Performed by: SURGERY

## 2022-03-12 PROCEDURE — 6360000002 HC RX W HCPCS: Performed by: SURGERY

## 2022-03-12 PROCEDURE — 36415 COLL VENOUS BLD VENIPUNCTURE: CPT

## 2022-03-12 PROCEDURE — 2500000003 HC RX 250 WO HCPCS: Performed by: ANESTHESIOLOGY

## 2022-03-12 PROCEDURE — 87070 CULTURE OTHR SPECIMN AEROBIC: CPT

## 2022-03-12 PROCEDURE — 6370000000 HC RX 637 (ALT 250 FOR IP): Performed by: SURGERY

## 2022-03-12 PROCEDURE — 6360000002 HC RX W HCPCS: Performed by: ANESTHESIOLOGY

## 2022-03-12 PROCEDURE — 85025 COMPLETE CBC W/AUTO DIFF WBC: CPT

## 2022-03-12 PROCEDURE — 7100000000 HC PACU RECOVERY - FIRST 15 MIN: Performed by: SURGERY

## 2022-03-12 PROCEDURE — 3600000012 HC SURGERY LEVEL 2 ADDTL 15MIN: Performed by: SURGERY

## 2022-03-12 PROCEDURE — 87077 CULTURE AEROBIC IDENTIFY: CPT

## 2022-03-12 PROCEDURE — 19020 MASTOTOMY EXPL DRG ABSC DP: CPT | Performed by: SURGERY

## 2022-03-12 PROCEDURE — 1200000000 HC SEMI PRIVATE

## 2022-03-12 PROCEDURE — 99252 IP/OBS CONSLTJ NEW/EST SF 35: CPT | Performed by: SURGERY

## 2022-03-12 PROCEDURE — 2709999900 HC NON-CHARGEABLE SUPPLY: Performed by: SURGERY

## 2022-03-12 PROCEDURE — 2500000003 HC RX 250 WO HCPCS: Performed by: SURGERY

## 2022-03-12 PROCEDURE — 86403 PARTICLE AGGLUT ANTBDY SCRN: CPT

## 2022-03-12 PROCEDURE — 2580000003 HC RX 258: Performed by: ANESTHESIOLOGY

## 2022-03-12 RX ORDER — ATROPINE SULFATE 0.4 MG/ML
AMPUL (ML) INJECTION PRN
Status: DISCONTINUED | OUTPATIENT
Start: 2022-03-12 | End: 2022-03-12 | Stop reason: SDUPTHER

## 2022-03-12 RX ORDER — HYDROMORPHONE HCL 110MG/55ML
1 PATIENT CONTROLLED ANALGESIA SYRINGE INTRAVENOUS EVERY 4 HOURS PRN
Status: DISCONTINUED | OUTPATIENT
Start: 2022-03-12 | End: 2022-03-14 | Stop reason: HOSPADM

## 2022-03-12 RX ORDER — HYDROMORPHONE HCL 110MG/55ML
0.5 PATIENT CONTROLLED ANALGESIA SYRINGE INTRAVENOUS EVERY 5 MIN PRN
Status: DISCONTINUED | OUTPATIENT
Start: 2022-03-12 | End: 2022-03-12 | Stop reason: HOSPADM

## 2022-03-12 RX ORDER — MEPERIDINE HYDROCHLORIDE 50 MG/ML
12.5 INJECTION INTRAMUSCULAR; INTRAVENOUS; SUBCUTANEOUS EVERY 5 MIN PRN
Status: DISCONTINUED | OUTPATIENT
Start: 2022-03-12 | End: 2022-03-12 | Stop reason: HOSPADM

## 2022-03-12 RX ORDER — PROPOFOL 10 MG/ML
INJECTION, EMULSION INTRAVENOUS PRN
Status: DISCONTINUED | OUTPATIENT
Start: 2022-03-12 | End: 2022-03-12 | Stop reason: SDUPTHER

## 2022-03-12 RX ORDER — DIPHENHYDRAMINE HYDROCHLORIDE 50 MG/ML
12.5 INJECTION INTRAMUSCULAR; INTRAVENOUS
Status: DISCONTINUED | OUTPATIENT
Start: 2022-03-12 | End: 2022-03-12 | Stop reason: HOSPADM

## 2022-03-12 RX ORDER — LIDOCAINE HYDROCHLORIDE 20 MG/ML
INJECTION, SOLUTION INFILTRATION; PERINEURAL PRN
Status: DISCONTINUED | OUTPATIENT
Start: 2022-03-12 | End: 2022-03-12 | Stop reason: SDUPTHER

## 2022-03-12 RX ORDER — OXYCODONE HYDROCHLORIDE 5 MG/1
5 TABLET ORAL EVERY 4 HOURS PRN
Status: DISCONTINUED | OUTPATIENT
Start: 2022-03-12 | End: 2022-03-14 | Stop reason: HOSPADM

## 2022-03-12 RX ORDER — SODIUM CHLORIDE 9 MG/ML
25 INJECTION, SOLUTION INTRAVENOUS PRN
Status: DISCONTINUED | OUTPATIENT
Start: 2022-03-12 | End: 2022-03-12 | Stop reason: HOSPADM

## 2022-03-12 RX ORDER — BUPIVACAINE HYDROCHLORIDE 5 MG/ML
INJECTION, SOLUTION EPIDURAL; INTRACAUDAL PRN
Status: DISCONTINUED | OUTPATIENT
Start: 2022-03-12 | End: 2022-03-12 | Stop reason: ALTCHOICE

## 2022-03-12 RX ORDER — OXYCODONE HYDROCHLORIDE 5 MG/1
10 TABLET ORAL EVERY 4 HOURS PRN
Status: DISCONTINUED | OUTPATIENT
Start: 2022-03-12 | End: 2022-03-14 | Stop reason: HOSPADM

## 2022-03-12 RX ORDER — LABETALOL HYDROCHLORIDE 5 MG/ML
10 INJECTION, SOLUTION INTRAVENOUS
Status: DISCONTINUED | OUTPATIENT
Start: 2022-03-12 | End: 2022-03-12 | Stop reason: HOSPADM

## 2022-03-12 RX ORDER — HYDROMORPHONE HCL 110MG/55ML
1 PATIENT CONTROLLED ANALGESIA SYRINGE INTRAVENOUS ONCE
Status: COMPLETED | OUTPATIENT
Start: 2022-03-12 | End: 2022-03-12

## 2022-03-12 RX ORDER — SODIUM CHLORIDE 9 MG/ML
INJECTION, SOLUTION INTRAVENOUS CONTINUOUS PRN
Status: DISCONTINUED | OUTPATIENT
Start: 2022-03-12 | End: 2022-03-12 | Stop reason: SDUPTHER

## 2022-03-12 RX ORDER — SODIUM CHLORIDE 0.9 % (FLUSH) 0.9 %
5-40 SYRINGE (ML) INJECTION PRN
Status: DISCONTINUED | OUTPATIENT
Start: 2022-03-12 | End: 2022-03-12 | Stop reason: HOSPADM

## 2022-03-12 RX ORDER — ONDANSETRON 2 MG/ML
4 INJECTION INTRAMUSCULAR; INTRAVENOUS
Status: DISCONTINUED | OUTPATIENT
Start: 2022-03-12 | End: 2022-03-12 | Stop reason: HOSPADM

## 2022-03-12 RX ORDER — KETOROLAC TROMETHAMINE 30 MG/ML
30 INJECTION, SOLUTION INTRAMUSCULAR; INTRAVENOUS EVERY 6 HOURS PRN
Status: COMPLETED | OUTPATIENT
Start: 2022-03-12 | End: 2022-03-13

## 2022-03-12 RX ORDER — FENTANYL CITRATE 50 UG/ML
INJECTION, SOLUTION INTRAMUSCULAR; INTRAVENOUS PRN
Status: DISCONTINUED | OUTPATIENT
Start: 2022-03-12 | End: 2022-03-12 | Stop reason: SDUPTHER

## 2022-03-12 RX ORDER — SODIUM CHLORIDE 0.9 % (FLUSH) 0.9 %
5-40 SYRINGE (ML) INJECTION EVERY 12 HOURS SCHEDULED
Status: DISCONTINUED | OUTPATIENT
Start: 2022-03-12 | End: 2022-03-12 | Stop reason: HOSPADM

## 2022-03-12 RX ORDER — OXYCODONE HYDROCHLORIDE 5 MG/1
10 TABLET ORAL PRN
Status: DISCONTINUED | OUTPATIENT
Start: 2022-03-12 | End: 2022-03-12 | Stop reason: HOSPADM

## 2022-03-12 RX ORDER — OXYCODONE HYDROCHLORIDE 5 MG/1
5 TABLET ORAL PRN
Status: DISCONTINUED | OUTPATIENT
Start: 2022-03-12 | End: 2022-03-12 | Stop reason: HOSPADM

## 2022-03-12 RX ORDER — HYDROMORPHONE HCL 110MG/55ML
0.25 PATIENT CONTROLLED ANALGESIA SYRINGE INTRAVENOUS EVERY 5 MIN PRN
Status: DISCONTINUED | OUTPATIENT
Start: 2022-03-12 | End: 2022-03-12 | Stop reason: HOSPADM

## 2022-03-12 RX ADMIN — HYDROMORPHONE HYDROCHLORIDE 1 MG: 2 INJECTION, SOLUTION INTRAMUSCULAR; INTRAVENOUS; SUBCUTANEOUS at 01:59

## 2022-03-12 RX ADMIN — ACETAMINOPHEN 650 MG: 325 TABLET ORAL at 01:59

## 2022-03-12 RX ADMIN — KETOROLAC TROMETHAMINE 30 MG: 30 INJECTION, SOLUTION INTRAMUSCULAR at 01:59

## 2022-03-12 RX ADMIN — SODIUM CHLORIDE, PRESERVATIVE FREE 10 ML: 5 INJECTION INTRAVENOUS at 23:52

## 2022-03-12 RX ADMIN — LIDOCAINE HYDROCHLORIDE 5 ML: 20 INJECTION, SOLUTION INFILTRATION; PERINEURAL at 11:37

## 2022-03-12 RX ADMIN — VANCOMYCIN HYDROCHLORIDE 1250 MG: 10 INJECTION, POWDER, LYOPHILIZED, FOR SOLUTION INTRAVENOUS at 22:08

## 2022-03-12 RX ADMIN — KETOROLAC TROMETHAMINE 30 MG: 30 INJECTION, SOLUTION INTRAMUSCULAR at 23:51

## 2022-03-12 RX ADMIN — PHENYLEPHRINE HYDROCHLORIDE 100 MCG: 10 INJECTION INTRAVENOUS at 11:42

## 2022-03-12 RX ADMIN — CEFTRIAXONE SODIUM 2000 MG: 2 INJECTION, POWDER, FOR SOLUTION INTRAMUSCULAR; INTRAVENOUS at 04:48

## 2022-03-12 RX ADMIN — SODIUM CHLORIDE 50 ML: 9 INJECTION, SOLUTION INTRAVENOUS at 22:07

## 2022-03-12 RX ADMIN — OXYCODONE 10 MG: 5 TABLET ORAL at 13:23

## 2022-03-12 RX ADMIN — FENTANYL CITRATE 100 MCG: 50 INJECTION INTRAMUSCULAR; INTRAVENOUS at 11:29

## 2022-03-12 RX ADMIN — PROPOFOL 350 MG: 10 INJECTION, EMULSION INTRAVENOUS at 11:37

## 2022-03-12 RX ADMIN — ATROPINE SULFATE 0.4 MG: 0.4 INJECTION, SOLUTION INTRAMUSCULAR; INTRAVENOUS; SUBCUTANEOUS at 11:44

## 2022-03-12 RX ADMIN — FENTANYL CITRATE 25 MCG: 50 INJECTION, SOLUTION INTRAMUSCULAR; INTRAVENOUS at 05:04

## 2022-03-12 RX ADMIN — VANCOMYCIN HYDROCHLORIDE 1250 MG: 10 INJECTION, POWDER, LYOPHILIZED, FOR SOLUTION INTRAVENOUS at 10:21

## 2022-03-12 RX ADMIN — OXYCODONE 10 MG: 5 TABLET ORAL at 20:30

## 2022-03-12 RX ADMIN — SODIUM CHLORIDE: 9 INJECTION, SOLUTION INTRAVENOUS at 11:33

## 2022-03-12 RX ADMIN — KETOROLAC TROMETHAMINE 30 MG: 30 INJECTION, SOLUTION INTRAMUSCULAR at 10:09

## 2022-03-12 RX ADMIN — KETOROLAC TROMETHAMINE 30 MG: 30 INJECTION, SOLUTION INTRAMUSCULAR at 17:37

## 2022-03-12 RX ADMIN — SODIUM CHLORIDE, PRESERVATIVE FREE 10 ML: 5 INJECTION INTRAVENOUS at 20:19

## 2022-03-12 ASSESSMENT — PULMONARY FUNCTION TESTS
PIF_VALUE: 3
PIF_VALUE: 1
PIF_VALUE: 0
PIF_VALUE: 2
PIF_VALUE: 2
PIF_VALUE: 19
PIF_VALUE: 20
PIF_VALUE: 0
PIF_VALUE: 3
PIF_VALUE: 19
PIF_VALUE: 20
PIF_VALUE: 3
PIF_VALUE: 2
PIF_VALUE: 14
PIF_VALUE: 2
PIF_VALUE: 0
PIF_VALUE: 2
PIF_VALUE: 2
PIF_VALUE: 1
PIF_VALUE: 20
PIF_VALUE: 3

## 2022-03-12 ASSESSMENT — PAIN SCALES - GENERAL
PAINLEVEL_OUTOF10: 7
PAINLEVEL_OUTOF10: 6
PAINLEVEL_OUTOF10: 5
PAINLEVEL_OUTOF10: 5
PAINLEVEL_OUTOF10: 0
PAINLEVEL_OUTOF10: 8
PAINLEVEL_OUTOF10: 7
PAINLEVEL_OUTOF10: 0
PAINLEVEL_OUTOF10: 5

## 2022-03-12 ASSESSMENT — PAIN DESCRIPTION - PROGRESSION
CLINICAL_PROGRESSION: GRADUALLY IMPROVING

## 2022-03-12 ASSESSMENT — LIFESTYLE VARIABLES: SMOKING_STATUS: 1

## 2022-03-12 NOTE — ANESTHESIA PRE PROCEDURE
Department of Anesthesiology  Preprocedure Note       Name:  Dionna Rosario   Age:  27 y.o.  :  1995                                          MRN:  [id number]         Date:  3/12/2022      Surgeon: Tod Thompson):  Chichi Caicedo MD    Procedure: Procedure(s):  LEFT BREAST INCISION AND DRAINAGE    Medications prior to admission:   Prior to Admission medications    Medication Sig Start Date End Date Taking?  Authorizing Provider   acetaminophen (TYLENOL) 325 MG tablet Take 650 mg by mouth every 6 hours as needed for Pain   Yes Historical Provider, MD   Prenatal Vit-Fe Fumarate-FA (PRENATAL/FOLIC ACID PO) Take 1 tablet by mouth daily   Yes Historical Provider, MD   ibuprofen (ADVIL;MOTRIN) 800 MG tablet Take 1 tablet by mouth every 8 hours as needed for Pain 22   Jono Newman MD   albuterol sulfate  (90 Base) MCG/ACT inhaler Inhale 2 puffs into the lungs every 4 hours as needed 17   Historical Provider, MD       Current medications:    Current Facility-Administered Medications   Medication Dose Route Frequency Provider Last Rate Last Admin    ketorolac (TORADOL) injection 30 mg  30 mg IntraVENous Q6H PRN SABINO Leblanc - CNP   30 mg at 22 1009    cefTRIAXone (ROCEPHIN) 2000 mg IVPB in D5W 50ml minibag  2,000 mg IntraVENous Q24H Dwain Perdue DO   Stopped at 22 0548    HYDROmorphone (DILAUDID) injection 1 mg  1 mg IntraVENous Q4H PRN Aryaester Jackson,         sodium chloride flush 0.9 % injection 10 mL  10 mL IntraVENous 2 times per day Erin Perdue DO   10 mL at 22 2345    sodium chloride flush 0.9 % injection 10 mL  10 mL IntraVENous PRN Dwain Perdue, DO        0.9 % sodium chloride infusion  25 mL IntraVENous PRN Sherrill Green Iron, DO        potassium chloride 10 mEq/100 mL IVPB (Peripheral Line)  10 mEq IntraVENous PRN Sherrill Green Iron, DO        magnesium sulfate 2000 mg in 50 mL IVPB premix  2,000 mg IntraVENous PRN Alvarez Cambric, DO        promethazine (PHENERGAN) tablet 12.5 mg  12.5 mg Oral Q6H PRN Ahmad A Iron, DO        Or    ondansetron (ZOFRAN) injection 4 mg  4 mg IntraVENous Q6H PRN Coye Fair Iron, DO        acetaminophen (TYLENOL) tablet 650 mg  650 mg Oral Q6H PRN Coye Fair Iron, DO   650 mg at 22 0159    Or    acetaminophen (TYLENOL) suppository 650 mg  650 mg Rectal Q6H PRN Ahmad A Iron, DO        albuterol sulfate  (90 Base) MCG/ACT inhaler 2 puff  2 puff Inhalation Q4H PRN Ahmad A Iron, DO        vancomycin (VANCOCIN) 1,250 mg in dextrose 5 % 250 mL IVPB  1,250 mg IntraVENous Q12H Ahmad A Iron, .7 mL/hr at 22 1021 1,250 mg at 22 1021       Allergies:  No Known Allergies    Problem List:    Patient Active Problem List   Diagnosis Code    Poor fetal growth affecting management of mother in third trimester O36.5930    Chronic hepatitis C complicating pregnancy, antepartum ([facility] O98.419, B18.2    History of COVID-19 Z86.16    Oligohydramnios in third trimester O41. 5X0    Intrauterine growth restriction (IUGR) affecting care of mother, third trimester, single gestation O45.26    Family history of congenital heart defect Z80.66    Family history of cleft lip Z82.79    Bipolar disease during pregnancy in third trimester ([facility] O99.343, F31.9    History of substance abuse ([facility] F19.11    Tobacco smoking affecting pregnancy O99.330    Abnormal O'Jc glucose challenge test, antepartum O99.810     (spontaneous vaginal delivery) O80    Breast abscess of female N61.1       Past Medical History:        Diagnosis Date    Anxiety     [facility] ([facility]     Stopped medications with +UPT    Depression     H/O seasonal allergies     Hepatitis C     confirmed  per prenatal    Heroin abuse ([facility]     last used 20- inpatient rehab.  Finished outpatient rehab 2021       Past Surgical History:        Procedure Laterality Date    LIP REPAIR      WISDOM TOOTH EXTRACTION Social History:    Social History     Tobacco Use    Smoking status: Current Every Day Smoker     Packs/day: 0.25     Years: 4.00     Pack years: 1.00    Smokeless tobacco: Never Used   Substance Use Topics    Alcohol use: No     Alcohol/week: 1.0 standard drink     Types: 1 Glasses of wine per week                                Ready to quit: Not Answered  Counseling given: Not Answered      Vital Signs (Current):   Vitals:    03/11/22 1851 03/11/22 2030 03/11/22 2159 03/12/22 0753   BP: 119/76 (!) 138/95 131/83 95/65   Pulse: 77 85 72 81   Resp: 18 16 16 16   Temp:   97.2 °F (36.2 °C) 98.1 °F (36.7 °C)   TempSrc:   Oral Oral   SpO2: 97% 98% 96% 96%   Weight:       Height:                                                  BP Readings from Last 3 Encounters:   03/12/22 95/65   02/13/22 116/63   01/25/22 125/77       NPO Status:                                                                                 BMI:   Wt Readings from Last 3 Encounters:   03/11/22 183 lb (83 kg)   02/10/22 202 lb (91.6 kg)   01/25/22 202 lb (91.6 kg)     Body mass index is 28.66 kg/m². CBC:   Lab Results   Component Value Date    WBC 9.2 03/12/2022    RBC 3.97 03/12/2022    HGB 12.1 03/12/2022    HCT 36.9 03/12/2022    MCV 92.7 03/12/2022    RDW 13.0 03/12/2022     03/12/2022       CMP:   Lab Results   Component Value Date     03/11/2022    K 4.4 03/11/2022     03/11/2022    CO2 25 03/11/2022    BUN 13 03/11/2022    CREATININE 0.8 03/11/2022    GFRAA >60 03/11/2022    AGRATIO 1.4 03/11/2022    LABGLOM >60 03/11/2022    GLUCOSE 101 03/11/2022    PROT 7.7 03/11/2022    CALCIUM 9.2 03/11/2022    BILITOT <0.2 03/11/2022    ALKPHOS 122 03/11/2022    AST 21 03/11/2022    ALT 26 03/11/2022       POC Tests: No results for input(s): POCGLU, POCNA, POCK, POCCL, POCBUN, POCHEMO, POCHCT in the last 72 hours.     Coags: No results found for: PROTIME, INR, APTT    HCG (If Applicable):   Lab Results   Component Value Date PREGTESTUR Neg 03/04/2011        ABGs: No results found for: PHART, PO2ART, ICV4ZSA, FHU5LEV, BEART, O2BGUEVU     Type & Screen (If Applicable):  No results found for: LABABO, LABRH    Drug/Infectious Status (If Applicable):  No results found for: HIV, HEPCAB    COVID-19 Screening (If Applicable):   Lab Results   Component Value Date    COVID19 Not Detected 03/11/2022    COVID19 Not Detected 01/31/2022           Anesthesia Evaluation   no history of anesthetic complications:   Airway: Mallampati: II  TM distance: >3 FB   Neck ROM: full  Mouth opening: > = 3 FB Dental: normal exam         Pulmonary:   (+) current smoker                           Cardiovascular:Negative CV ROS                      Neuro/Psych:   (+) psychiatric history:depression/anxiety             GI/Hepatic/Renal:   (+) hepatitis: C, liver disease:,           Endo/Other: Negative Endo/Other ROS                    Abdominal:             Vascular: Other Findings:           Anesthesia Plan      general     ASA 3     (Risks, benefits and alternatives of GA discussed with pt. Questions answered. Willing to proceed.)  Induction: intravenous. Anesthetic plan and risks discussed with patient.                       Brandi Patel MD   3/12/2022

## 2022-03-12 NOTE — PROGRESS NOTES
Patient received from floor, alert and oriented, floor VSS. Left breast painful. Dr. Doris Melara at bedside. Consents signed and pain medicine will be give per anesthesia, see anesthesia charting. No family here. Patient gave contact name.

## 2022-03-12 NOTE — PROGRESS NOTES
Hospitalist Progress Note  3/12/2022 2:09 PM  Subjective:   Admit Date: 3/11/2022  PCP: none   Status:  inpatient  Interval History: Hospital Day: 2, admitted with postpartum left breast abscess refractory to oral antibiotic prescribed by OB. She is  with vaginal delivery 4 weeks ago. She recently had vaginal delivery and is breast feeding. She developed L breast redness. Incision and drainage (3/12, Dr Shruti Lipscomb). History of anxiety, bipolar type II, depression, and hepatitis C. Diet: Regular  Right antecubital peripheral IV ((3/11, day #2)  Medications:     ceftriaxone  2,000 mg IntraVENous Q24H (3/11, day #2)   vancomycin  1,250 mg IntraVENous Q12H (3/11, day #2)     Recent Labs     22  1623 22  0537   WBC 11.2* 9.2   HGB 12.6 12.1    353   MCV 92.0 92.7     Recent Labs     22  1623      K 4.4      CO2 25   BUN 13   CREATININE 0.8   GLUCOSE 101*     Recent Labs     22  1623   AST 21   ALT 26   BILITOT <0.2   ALKPHOS 122     SARS-CoV-2 NAAT (3/11) not detected  Serum hCG (3/11) negative  Lactate (3/11) 0.8 mmol/L    Left breast ultrasound (3/11) Inhomogeneous 4.1 cm collection in the 2 o'clock position of the left breast,  consistent with abscess.  Neoplasm is less likely.  Continued surveillance is recommended. Portable CXR (3/11) No airspace disease by radiograph given suboptimal evaluation of the lung bases due to overlying soft tissue.       Objective:   Vitals:  BP 99/67   Pulse 84   Temp 97.4 °F (oral)   Resp 16   Ht 5' 7\"  Wt 83 kg  LMP 2021   SpO2 99% on RA  BMI 28.66 kg/m²   General appearance: alert and cooperative with exam, post-op still some somnolence  Lungs: clear to auscultation bilaterally   Chest: Left breast post-op, dressing clean / dry  Heart: regular rate and rhythm, S1, S2 normal, no murmur, click, rub or gallop  Abdomen: soft, non-tender; bowel sounds normal; no masses,  no organomegaly  Extremities: extremities normal,

## 2022-03-12 NOTE — CONSULTS
Consult re-called  Consult called to breast center and left a message  Who:Dr. Santa Bardales  Date:3/12/2022,  Time:8:13 AM        Electronically signed by Yamilet Downs on 3/12/2022 at 8:13 AM

## 2022-03-12 NOTE — BRIEF OP NOTE
Brief Postoperative Note      Patient: Gabriela Etienne  YOB: 1995  MRN: [id number]    Date of Procedure: 3/12/2022    Pre-Op Diagnosis: LEFT BREAST ABSCESS    Post-Op Diagnosis: Same       Procedure(s):  LEFT BREAST INCISION AND DRAINAGE    Surgeon(s):  Dawit Valerio MD    Assistant:  Surgical Assistant: Luisa Villegas    Anesthesia: General    Estimated Blood Loss (mL): Minimal    Complications: None    Specimens:   * No specimens in log *    Implants:  * No implants in log *      Drains:   [REMOVED] Urethral Catheter Non-latex 16 fr (Removed)   $ Urethral catheter insertion $ Not inserted for procedure 02/11/22 2022   Catheter Indications Prolonged immobilization (e.g. unstable thoracic or lumbar spine, multiple traumatic injuries such as pelvic fractures) 02/11/22 2022   Site Assessment Pink;Moist 02/11/22 2022   Urine Color Yellow 02/11/22 2022   Output (mL) 100 mL 02/11/22 2300       Findings: As above    Electronically signed by Karsten Wyman MD on 3/12/2022 at 11:47 AM

## 2022-03-12 NOTE — CONSULTS
Consult call back    Who:Dr. Jas Dietz  Date:3/12/2022,  Time:8:26 AM        Electronically signed by Yamilet Downs on 3/12/2022 at 8:26 AM

## 2022-03-12 NOTE — RT PROTOCOL NOTE
RT Inhaler-Nebulizer Bronchodilator Protocol Note    There is a bronchodilator order in the chart from a provider indicating to follow the RT Bronchodilator Protocol and there is an Initiate RT Inhaler-Nebulizer Bronchodilator Protocol order as well (see protocol at bottom of note). CXR Findings:  XR CHEST PORTABLE    Result Date: 3/11/2022  No airspace disease by radiograph given suboptimal evaluation of the lung bases due to overlying soft tissue. .       The findings from the last RT Protocol Assessment were as follows:   History Pulmonary Disease: Smoker 15 pack years or more  Respiratory Pattern: Regular pattern and RR 12-20 bpm  Breath Sounds: Clear breath sounds  Cough: Strong, spontaneous, non-productive  Indication for Bronchodilator Therapy: On home bronchodilators  Bronchodilator Assessment Score: 1    Aerosolized bronchodilator medication orders have been revised according to the RT Inhaler-Nebulizer Bronchodilator Protocol below. Respiratory Therapist to perform RT Therapy Protocol Assessment initially then follow the protocol. Repeat RT Therapy Protocol Assessment PRN for score 0-3 or on second treatment, BID, and PRN for scores above 3. No Indications - adjust the frequency to every 6 hours PRN wheezing or bronchospasm, if no treatments needed after 48 hours then discontinue using Per Protocol order mode. If indication present, adjust the RT bronchodilator orders based on the Bronchodilator Assessment Score as indicated below. Use Inhaler orders unless patient has one or more of the following: on home nebulizer, not able to hold breath for 10 seconds, is not alert and oriented, cannot activate and use MDI correctly, or respiratory rate 25 breaths per minute or more, then use the equivalent nebulizer order(s) with same Frequency and PRN reasons based on the score. If a patient is on this medication at home then do not decrease Frequency below that used at home.     0-3 - enter or revise RT bronchodilator order(s) to equivalent RT Bronchodilator order with Frequency of every 4 hours PRN for wheezing or increased work of breathing using Per Protocol order mode. 4-6 - enter or revise RT Bronchodilator order(s) to two equivalent RT bronchodilator orders with one order with BID Frequency and one order with Frequency of every 4 hours PRN wheezing or increased work of breathing using Per Protocol order mode. 7-10 - enter or revise RT Bronchodilator order(s) to two equivalent RT bronchodilator orders with one order with TID Frequency and one order with Frequency of every 4 hours PRN wheezing or increased work of breathing using Per Protocol order mode. 11-13 - enter or revise RT Bronchodilator order(s) to one equivalent RT bronchodilator order with QID Frequency and an Albuterol order with Frequency of every 4 hours PRN wheezing or increased work of breathing using Per Protocol order mode. Greater than 13 - enter or revise RT Bronchodilator order(s) to one equivalent RT bronchodilator order with every 4 hours Frequency and an Albuterol order with Frequency of every 2 hours PRN wheezing or increased work of breathing using Per Protocol order mode. RT to enter RT Home Evaluation for COPD & MDI Assessment order using Per Protocol order mode.     Electronically signed by Carlita Mcadams RCP on 3/11/2022 at 8:56 PM

## 2022-03-12 NOTE — CONSULTS
Department of Gynecology  Attending Consult Note      Reason for Consult:  S/P , BREASTFEEDING MOTHER, MEDICATON REVIEW    Requesting Physician:  DR. Coy Ahr:   Left breast abscess    History obtained from patient    HISTORY OF PRESENT ILLNESS:                   The patient is a 27 y.o. Dina Staley s/p  on 2/10/22, called office on 22 c/o mastitis symptoms, pt was rx'd dicloxacillin & reports she completed the medication. Called office on 3/10/22 c/o no improvement of symptoms and presented to office on 3/11/22 for evaluation, c/o left breast pain w/ drainage of wound. Pt denies F/C/NS, N/V/D/C, SOB, HA, palpitations. Denies symptoms in right breast. Pt is breast feeding, pumping, and bottle feeding. Pt reports she is feeling tired. Pain controlled w/ meds. Pt is currently NPO. Past Medical History:        Diagnosis Date    Anxiety     Bipolar 2 disorder ([facility] [address]     Stopped medications with +UPT    Depression     H/O seasonal allergies     Hepatitis C     confirmed  per prenatal    Heroin abuse ([facility] [address]     last used 20- inpatient rehab. Finished outpatient rehab 2021     Past Surgical History:        Procedure Laterality Date    LIP REPAIR      WISDOM TOOTH EXTRACTION         Past Gynecological History:    1. Last menstrual period:  2021  2. Menses: regular, q 28 days, normal flow  3. Contraception: None  4. Sexually transmitted disease history: Hepatitis C        A. Number of sexual partners in the last 6 months: 1    5. Pap History: Neg pap w/ neg co-testing 21        6.  Date of last mammogram: N/A    OB History    Para Term  AB Living   2 2 2     2   SAB IAB Ectopic Molar Multiple Live Births           0 2      # Outcome Date GA Lbr Dawson/2nd Weight Sex Delivery Anes PTL Lv   2 Term 22 39w1d 05:19 / 00:07 5 lb 14 oz (2.665 kg) F Vag-Spont EPI N LOUANN   1 Term      Vag-Spont   LOUANN       Medications Prior to Admission:   Medications Prior to Admission: acetaminophen (TYLENOL) 325 MG tablet, Take 650 mg by mouth every 6 hours as needed for Pain  Prenatal Vit-Fe Fumarate-FA (PRENATAL/FOLIC ACID PO), Take 1 tablet by mouth daily  [DISCONTINUED] docusate sodium (COLACE) 100 MG capsule, Take 1 capsule by mouth 2 times daily  [DISCONTINUED] ibuprofen (ADVIL;MOTRIN) 800 MG tablet, Take 1 tablet by mouth 2 times daily as needed for Pain  ibuprofen (ADVIL;MOTRIN) 800 MG tablet, Take 1 tablet by mouth every 8 hours as needed for Pain  [DISCONTINUED] docusate sodium (COLACE) 100 MG capsule, Take 1 capsule by mouth 2 times daily  albuterol sulfate  (90 Base) MCG/ACT inhaler, Inhale 2 puffs into the lungs every 4 hours as needed    Allergies:  Patient has no known allergies. Social History:  TOBACCO:   reports that she has been smoking. She has a 1.00 pack-year smoking history. She has never used smokeless tobacco.  ETOH:   reports no history of alcohol use. DRUGS:   reports no history of drug use. Family History:   History reviewed. No pertinent family history.     REVIEW OF SYSTEMS:      Negative otherwise mentioned in HPI    PHYSICAL EXAM:    Vitals:  /83   Pulse 72   Temp 97.2 °F (36.2 °C) (Oral)   Resp 16   Ht 5' 7\" (1.702 m)   Wt 183 lb (83 kg)   LMP 05/26/2021   SpO2 96%   BMI 28.66 kg/m²     Heart: RRR  GELY:CTA b/l  Breast: left breast 5cm fluctuant mass, painful, erythema, no drainage, normal nipple  Rt breast: NT, no masses  Abd: soft, NT, ND, +BS  Ext: no c/c/e    DATA:  Labs:    CBC:   Lab Results   Component Value Date    WBC 9.2 03/12/2022    RBC 3.97 03/12/2022    HGB 12.1 03/12/2022    HCT 36.9 03/12/2022    MCV 92.7 03/12/2022    RDW 13.0 03/12/2022     03/12/2022     CMP:    Lab Results   Component Value Date     03/11/2022    K 4.4 03/11/2022     03/11/2022    CO2 25 03/11/2022    BUN 13 03/11/2022    PROT 7.7 03/11/2022     Rapid COVID - 19 - neg  Qual HCG - neg  Urinalysis - neg  Blood Cx x 2 - pending  Ref Range & Units 3/11/22 1623    Lactic Acid, Sepsis 0.4 - 1.9 mmol/L 0.8        IMPRESSION/RECOMMENDATIONS:    26 y/o  w/ breast abscess    Principal Problem:  Breast abscess  Plan: agree w/ plan for antibiotics & breast surgeon consult  Agree w/ current meds for breast feeding mother - mother to continue pumping for now  STD prophylaxis - suggest SCD's  Will renew dilaudid for pain management since NPO  Will continue to follow during admission  Appreciate consult - contact office at [phone] with questions/concerns

## 2022-03-12 NOTE — LACTATION NOTE
Lactation Progress Note      Data:   RN called LC to assist with pump session before drainage of abscess. Action: Assisted mob with cool compress prior to pump session. After about 5 minutes LC assisted mob with pump session  holding flanges to breast. MOB states flange fits much better and she is not having severe pain with pump session, just minimal discomfort on left breast. Pump x10 minutes. Collected about 1 ounce of breast milk from right breast and 14 mls of breast milk from left breast. Cool pack re applied for about 5 minutes to left breast. MOB tolerated well. Response: MOB to call LC back to room after incision and drainage or prn.

## 2022-03-12 NOTE — CONSULTS
[facility]    HPI:  Patient is 27y.o. year old female seen. She presents because of infection. She can feel a lump. The area of concern is on the left. The problem has been present for 2 weeks. There has not been any biopsy. She does not have a personal history of breast disease. She recently had vaginal delivery and is breast feeding. She developed L breast redness. She was given outpatient antibiotics. The redness and breast symptoms have not improved. The fever and body aches did improve. Past Medical History:   Diagnosis Date    Anxiety     Bipolar 2 disorder ([facility] [address]     Stopped medications with +UPT    Depression     H/O seasonal allergies     Hepatitis C     confirmed 8/29 per prenatal    Heroin abuse ([facility] [address]     last used 9/28/20- inpatient rehab. Finished outpatient rehab 4/2021       Past Surgical History:   Procedure Laterality Date    LIP REPAIR      WISDOM TOOTH EXTRACTION         No current facility-administered medications on file prior to encounter.      Current Outpatient Medications on File Prior to Encounter   Medication Sig Dispense Refill    acetaminophen (TYLENOL) 325 MG tablet Take 650 mg by mouth every 6 hours as needed for Pain      Prenatal Vit-Fe Fumarate-FA (PRENATAL/FOLIC ACID PO) Take 1 tablet by mouth daily      ibuprofen (ADVIL;MOTRIN) 800 MG tablet Take 1 tablet by mouth every 8 hours as needed for Pain 30 tablet 0    albuterol sulfate  (90 Base) MCG/ACT inhaler Inhale 2 puffs into the lungs every 4 hours as needed         No Known Allergies    Social History     Socioeconomic History    Marital status: Single     Spouse name: Not on file    Number of children: Not on file    Years of education: Not on file    Highest education level: Not on file   Occupational History    Not on file   Tobacco Use    Smoking status: Current Every Day Smoker     Packs/day: 0.25     Years: 4.00     Pack years: 1.00    Smokeless tobacco: Never Used Substance and Sexual Activity    Alcohol use: No     Alcohol/week: 1.0 standard drink     Types: 1 Glasses of wine per week    Drug use: No    Sexual activity: Yes     Partners: Male   Other Topics Concern    Not on file   Social History Narrative    Not on file     Social Determinants of Health     Financial Resource Strain:     Difficulty of Paying Living Expenses: Not on file   Food Insecurity:     Worried About Running Out of Food in the Last Year: Not on file    Shane of Food in the Last Year: Not on file   Transportation Needs:     Lack of Transportation (Medical): Not on file    Lack of Transportation (Non-Medical): Not on file   Physical Activity:     Days of Exercise per Week: Not on file    Minutes of Exercise per Session: Not on file   Stress:     Feeling of Stress : Not on file   Social Connections:     Frequency of Communication with Friends and Family: Not on file    Frequency of Social Gatherings with Friends and Family: Not on file    Attends Orthodoxy Services: Not on file    Active Member of [address] [place] [id number] or Organizations: Not on file    Attends Club or Organization Meetings: Not on file    Marital Status: Not on file   Intimate Partner Violence:     Fear of Current or Ex-Partner: Not on file    Emotionally Abused: Not on file    Physically Abused: Not on file    Sexually Abused: Not on file   Housing Stability:     Unable to Pay for Housing in the Last Year: Not on file    Number of [place] in the Last Year: Not on file    Unstable Housing in the Last Year: Not on file       History reviewed. No pertinent family history. ROS:  She reports no complaints related to the eyes, ears , nose throat or mouth. She denies weight loss. No chest pain. No SOB. No urinary complaints. No musculoskeletal complaints. No skin rashes. No neurologic deficits. No bleeding tendencies. No GI complaints.     Physical Exam:  Vitals:    03/12/22 0753   BP: 95/65   Pulse: 81   Resp: 16 Temp: 98.1 °F (36.7 °C)   SpO2: 96%     General:  Comfortable  Eyes:  No scleral icterus  Ears:  Normal  Nose:  Normal  Mouth:  Mucous membranes moist  Respiratory: Lungs CTA. No accessory muscle use. Heart:  Regular rhythm  Abdomen:  Soft. Non tender. Non distended. Musculoskeletal:  No abnormal movements. ROM extremities normal.  Skin:  No rashes. Neurologic:  No focal deficits. Psychiatric:  AAA. O x 3. Breast: L breast upper outer quadrant with erythema, fluctuance and tenderness. Radiographic studies:    Ultrasound L breast with fluid collection      ASSESSMENT:  L breast abscess      PLAN:  The diagnosis and recommended procedure were explained. Questions answered. Prepare for operative I&D. Continue antibiotics.

## 2022-03-12 NOTE — ANESTHESIA POSTPROCEDURE EVALUATION
Department of Anesthesiology  Postprocedure Note    Patient: Gaby Licona  MRN: [id number]  YOB: 1995  Date of evaluation: 3/12/2022  Time:  2:14 PM     Procedure Summary     Date: 03/12/22 Room / Location: [address]    Anesthesia Start: 6917 Anesthesia Stop: 8663    Procedure: LEFT BREAST INCISION AND DRAINAGE (Left Breast) Diagnosis: (LEFT BREAST IRRIGATION AND DEBRIDEMENT)    Surgeons: Chi Martinez MD Responsible Provider: Tali Mas MD    Anesthesia Type: general ASA Status: 3          Anesthesia Type: general    Kyara Phase I: Kyara Score: 5    Kyara Phase II:      Last vitals: Reviewed and per EMR flowsheets. Anesthesia Post Evaluation    Patient location during evaluation: PACU  Patient participation: complete - patient participated  Level of consciousness: awake and alert  Airway patency: patent  Nausea & Vomiting: no nausea and no vomiting  Complications: no  Cardiovascular status: blood pressure returned to baseline  Respiratory status: acceptable  Hydration status: euvolemic  Comments: VSS on transfer to phase 2 recovery. No anesthetic complications.

## 2022-03-12 NOTE — PROGRESS NOTES
NP PAGE via [employer]:   post partum pt with severe left breast pain due to abscess. Unable to pump due to pain. May she have topical ointment and/or lidocaine patch to help relieve pain ? Please advise. Thank you! Spoke with OB about recommendations for this pt. They recommend Toradol or 800mg ibuprofen alternated with 1000mg Tylenol Q4H.  Lactation to see pt in AM and they recommend ob/gyn consult      PRN toradol ordered, ob/gyn consulted, 1 time ordered of Dilaudid ordered

## 2022-03-12 NOTE — LACTATION NOTE
Lactation Progress Note      Data:   RN request LC consult to evaluate mastitis symptoms/breast abscess, and pump fitting evalaution. The patient is a 27 y.o. Artice Stagger s/p  on 2/10/22. MOB states that she has been breastfeeding infant directly for about 1 month using a nipple shield on left breast. States about 1 week ago, she noticed infant being more fussy at breast, and mob felt maybe her supply was decreasing. States that she attempting pumping left breast and did not collect much breast milk only about 10 ml. States overnight and next day, her breast started to become red and swollen, very sore to touch. She then called MD office on 22 with c/o mastitis symptoms. Dr. Brooklynn Escobar prescribed  dicloxacillin &  and mob reports she completed the medication x10 days. Called office back on 3/10/22 c/o no improvement of symptoms and presented to office on 3/11/22 for evaluation of left breast pain w/ drainage of wound. QUINN currently using her [employer] pump which is at bedside. States that she has been pumping about 15 minutes and collecting gtts of breast milk from her left breast, and about 1 1/2 ounces from right breast. Left Breast  very sore to touch, and pump as well. QUINN is using 25mm flanges currently. MOB would like to discontinue breastfeeding, and only pump for relief of pain and discomfort while drying up her breast milk supply. POC is to drain abscess this morning b/t 11-12 per RN. MOB has been discarding all breast milk that has been pumped since hospital admission. Action: Introduced self to mob as  [facility] for the day. Assessed left breast. Observed area of about 5 cm of swollen, erythematous tissue tender to touch. Abscess noted around 2'oclock position on left breast scabbed over. No drainage noted at this time, with normal nipple other than some soreness noted per mob. Rt breast is soft, non tender, with no lumps noted.    MOB was encouraged to provide cool compresses on breast prior to pump sessions and after to help reduce inflammation and pain. MOB was fitted for correct flange as well, and hospital grade pump brought to bedside. MOB was instruction to continue pumping breast about every 3 hours for now for about 10 minutes, along with IV Toradol or motrin po  for pain management to help decrease inflammation. LC discussed mob wishes regarding drying up supply after drainage of abscess, and formula feeding infant. Reviewed how to dry up supply, but would consult back after surgery for f/u care to review. RN updated on mob's POC at this time, and to call LC back with next pump session to help mob with pumping and provide support at this time. Response: MOB Comfortable with POC at this time. Verbalizes understanding of education that was provided, and plans to continue pumping/ice at this time. Will call LC back to room with next pump session to assist and provide support.

## 2022-03-12 NOTE — H&P
Hospital Medicine History & Physical      PCP: No primary care provider on file. Date of Admission: 3/11/2022    Date of Service: Pt seen/examined on 3/11/2022    Pt seen/examined face to face on and admitted as inpatient with expected LOS greater than two midnights due to medical therapy    Chief Complaint:    Chief Complaint   Patient presents with    Abscess     pt had mastitis on left breast and treated with ABX, didnt go away and pt went to OB, they said it has turned into an abscess and pt was told to come to ED        History Of Present Illness:      27 y.o. female who presented to [facility] with past medical history of anxiety, bipolar type II, depression, hepatitis C presented to the ED with chief complaint of breast pain. Patient reported she started developing left breast pain localized to the anterior lateral progressively worsening becoming red erythematous tender. Patient went to Dr. Maggi Freitas office was taken 10-day course of antibiotic but continued to spike fevers with severe pain. Patient reported that she continues to breast-feed from the right side because the left side does not give any milk. Patient reported after being on antibiotic did not improve patient came here. Patient reported that the pain is very debilitating no known alleviating exacerbating factor. Patient denied having any prior history of ultrasound, mammography. Patient does admit significant family history of breast and uterine cancer in the family. Patient reports that she is not been having drenching night sweats or weight loss. Past Medical History:          Diagnosis Date    Anxiety     Bipolar 2 disorder ([facility] [address]     Stopped medications with +UPT    Depression     H/O seasonal allergies     Hepatitis C     confirmed 8/29 per prenatal    Heroin abuse ([facility] [address]     last used 9/28/20- inpatient rehab.  Finished outpatient rehab 4/2021       Past Surgical History:          Procedure Laterality Date  LIP REPAIR      WISDOM TOOTH EXTRACTION         Medications Prior to Admission:      Prior to Admission medications    Medication Sig Start Date End Date Taking? Authorizing Provider   acetaminophen (TYLENOL) 325 MG tablet Take 650 mg by mouth every 6 hours as needed for Pain   Yes Historical Provider, MD   Prenatal Vit-Fe Fumarate-FA (PRENATAL/FOLIC ACID PO) Take 1 tablet by mouth daily   Yes Historical Provider, MD   ibuprofen (ADVIL;MOTRIN) 800 MG tablet Take 1 tablet by mouth every 8 hours as needed for Pain 2/12/22   Anastacia Jacob MD   albuterol sulfate  (90 Base) MCG/ACT inhaler Inhale 2 puffs into the lungs every 4 hours as needed 11/1/17   Historical Provider, MD       Allergies:  Patient has no known allergies. Social History:          TOBACCO:   reports that she has been smoking. She has a 1.00 pack-year smoking history. She has never used smokeless tobacco.  ETOH:   reports no history of alcohol use. E-Cigarettes/Vaping Use     Questions Responses    E-Cigarette/Vaping Use     Start Date     Passive Exposure     Quit Date     Counseling Given     Comments             Family History:      Reviewed in detail, and positive for breast cancer. REVIEW OF SYSTEMS:     Constitutional:  No Fever, No Chills, No Night Sweats  ENT/Mouth:  No Nasal Congestion,  No Hoarseness, No new mouth lesion  Eyes:  No Eye Pain, No Redness, No Discharge  Cardiovascular:  No Chest Pain, No Orthopnea, No Palpitations  Respiratory:  No Cough, No Sputum, No Dyspnea  Gastrointestinal: No Vomiting, No Diarrhea, No abdominal pain  Genitourinary: No Urinary Frequency, No Hematuria, No Urinary pain  Musculoskeletal:  No worsening Arthralgias, No worsening Myalgias  Skin: + New Skin Lesions, + new skin rash  Neuro:  No new weakness, No new numbness.   Psych:  No suicial ideation, No Violence ideation    PHYSICAL EXAM PERFORMED:    /83   Pulse 72   Temp 97.2 °F (36.2 °C) (Oral)   Resp 16   Ht 5' 7\" (1.702 m) Wt 183 lb (83 kg)   LMP 05/26/2021   SpO2 96%   BMI 28.66 kg/m²     General appearance:  mild acute distress, appears older than stated age  [de-identified]:   atraumatic, sclera anicteric, Conjunctivae clear. Neck: Supple,Trachea midline, no goiter  Respiratory:minimal accessory muscle usage, Normal respiratory effort. Clear to auscultation, bilaterally without wheezing  Breast: Left lesion erythematous tender no lymphadenopathy  Cardiovascular:  Regular rate and rhythm, capillary refill 2 seconds  Abdomen: Soft, non-tender, non-distended with normal bowel sounds. Musculoskeletal:  No clubbing, cyanosis. trace edema LE bilaterally. Skin: turgor normal.  No new rashes or lesions. Neurologic: Alert and oriented x4, no new focal sensory/motor deficits. Labs:     Recent Labs     03/11/22  1623   WBC 11.2*   HGB 12.6   HCT 37.9        Recent Labs     03/11/22  1623      K 4.4      CO2 25   BUN 13   CREATININE 0.8   CALCIUM 9.2     Recent Labs     03/11/22  1623   AST 21   ALT 26   BILITOT <0.2   ALKPHOS 122     No results for input(s): INR in the last 72 hours. No results for input(s): [place] in the last 72 hours. Urinalysis:      Lab Results   Component Value Date    NITRU Negative 03/11/2022    BLOODU Negative 03/11/2022    SPECGRAV >=1.030 03/11/2022    GLUCOSEU Negative 03/11/2022    GLUCOSEU Negative 03/04/2011       Radiology:     CXR: I have reviewed the CXR with the following interpretation:   No acute process  EKG:  I have reviewed the EKG with the following interpretation:   Pending  US SOFT TISSUE LIMITED AREA   Final Result   Inhomogeneous 4.1 cm collection in the 2 o'clock position of the left breast,   consistent with abscess. Neoplasm is less likely. Continued surveillance is   recommended. RECOMMENDATION:   Follow-up ultrasound is recommended in 3 months.          XR CHEST PORTABLE   Final Result   No airspace disease by radiograph given suboptimal evaluation of the lung   bases due to overlying soft tissue. .             ASSESSMENT AND PLAN:    Active Hospital Problems    Diagnosis Date Noted    Breast abscess [N61.1] 03/11/2022     Left breast abscess:  IV Vanco and ceftriaxone  Surgery consulted for I&D given size>2cm    Hepatitis C: Untreated due to patient was pregnant at the time. Outpatient follow-up    Depression: Patient reported not on any medication, no SI, outpatient follow-up  Bipolar type II: Patient reported stop medication while pregnant.   Outpatient follow-up    Diet: NPO except meds ordered    DVT Prophylaxis: Held    Dispo:   Expected LOS greater than two [facility]

## 2022-03-12 NOTE — CONSULTS
Consult ruslan Morgan served    [id number] [facility]  Date:3/11/2022,  [address] PM        Electronically signed by Germán Landers on 3/11/2022 at 9:13 PM

## 2022-03-13 PROCEDURE — 6360000002 HC RX W HCPCS: Performed by: SURGERY

## 2022-03-13 PROCEDURE — 99024 POSTOP FOLLOW-UP VISIT: CPT | Performed by: SURGERY

## 2022-03-13 PROCEDURE — 2580000003 HC RX 258: Performed by: SURGERY

## 2022-03-13 PROCEDURE — 1200000000 HC SEMI PRIVATE

## 2022-03-13 PROCEDURE — 6370000000 HC RX 637 (ALT 250 FOR IP): Performed by: SURGERY

## 2022-03-13 RX ADMIN — OXYCODONE 10 MG: 5 TABLET ORAL at 23:37

## 2022-03-13 RX ADMIN — SODIUM CHLORIDE, PRESERVATIVE FREE 10 ML: 5 INJECTION INTRAVENOUS at 21:53

## 2022-03-13 RX ADMIN — OXYCODONE 10 MG: 5 TABLET ORAL at 05:53

## 2022-03-13 RX ADMIN — SODIUM CHLORIDE, PRESERVATIVE FREE 10 ML: 5 INJECTION INTRAVENOUS at 08:50

## 2022-03-13 RX ADMIN — CEFTRIAXONE SODIUM 2000 MG: 2 INJECTION, POWDER, FOR SOLUTION INTRAMUSCULAR; INTRAVENOUS at 03:50

## 2022-03-13 RX ADMIN — OXYCODONE 10 MG: 5 TABLET ORAL at 14:56

## 2022-03-13 RX ADMIN — SODIUM CHLORIDE, PRESERVATIVE FREE 10 ML: 5 INJECTION INTRAVENOUS at 03:47

## 2022-03-13 RX ADMIN — VANCOMYCIN HYDROCHLORIDE 1250 MG: 10 INJECTION, POWDER, LYOPHILIZED, FOR SOLUTION INTRAVENOUS at 09:59

## 2022-03-13 RX ADMIN — SODIUM CHLORIDE, PRESERVATIVE FREE 10 ML: 5 INJECTION INTRAVENOUS at 19:37

## 2022-03-13 RX ADMIN — VANCOMYCIN HYDROCHLORIDE 1250 MG: 10 INJECTION, POWDER, LYOPHILIZED, FOR SOLUTION INTRAVENOUS at 21:55

## 2022-03-13 RX ADMIN — OXYCODONE 10 MG: 5 TABLET ORAL at 10:40

## 2022-03-13 RX ADMIN — OXYCODONE 10 MG: 5 TABLET ORAL at 00:33

## 2022-03-13 RX ADMIN — OXYCODONE 10 MG: 5 TABLET ORAL at 19:37

## 2022-03-13 RX ADMIN — KETOROLAC TROMETHAMINE 30 MG: 30 INJECTION, SOLUTION INTRAMUSCULAR at 10:03

## 2022-03-13 ASSESSMENT — PAIN SCALES - GENERAL
PAINLEVEL_OUTOF10: 7
PAINLEVEL_OUTOF10: 6
PAINLEVEL_OUTOF10: 6
PAINLEVEL_OUTOF10: 7

## 2022-03-13 ASSESSMENT — PAIN DESCRIPTION - LOCATION: LOCATION: BREAST

## 2022-03-13 ASSESSMENT — PAIN DESCRIPTION - PAIN TYPE: TYPE: SURGICAL PAIN

## 2022-03-13 NOTE — LACTATION NOTE
Lactation Progress Note      Data:    F/U consult with patient on POD 1 I&D L breast abscess. MOB c/o left breast pain at time of consult. Feels it is focused on area of wound and not because of engorgement. QUINN states that she has not pumped left breast since 1100 yesterday with this [facility] consultant. QUINN just finished pumping her right breast x10 minutes collecting about 50 mls. MOB decided she wants to suppress her milk production, and pump and bottle feed her 2 month old infant formula. Action: Introduced self again to mob as [facility] for the day. Name and number placed on board. MOB requesting assistance with washing her pump parts as she is in pain, and just finishing pumping her right breast. PROSPER updated RN on mob's pain at this time, and request to pull up dressing on breast to evaluate nipple and breast area. RN states that [facility] can pull up dressing to evaluate breast for redness, engorgement, and need for removing any milk. Cold wet wash cloth placed on left breast to help relieve discomfort, and also to help remove some of the tape to pull up dressing. Breast appears to be soft with no signs of redness around areola or lower aspect of breast. LC did provide 1 minute of pumping to left breast to help relieve some discomfort per mob request, but did not remove any milk at this time. Dressing was then brought back down over breast. MOB tolerated well. RN also brought mob some pain medicine per her request. [facility] then reviewed how to continue to suppress her milk production with handout provided. Suppressing milk    If your breasts are firmly supported and you don't express more milk than is needed for comfort, your milk supply will gradually decrease. Wear a firm supportive bra (no under wires) both day and night to support your breasts and keep you comfortable. Use breast pads to soak up any leaking milk. Change them as they become wet.   Relieve pain and swelling by putting cold/gel packs in your bra, or use cold compresses after a shower or bath. Cold cabbage leaves worn inside the bra can also be soothing. Wash and dry the leaves before use and cut out any large, bumpy veins. Keep them in the fridge as they need to be cold. Change the leaves every 2 hours or when they become limp. Continue using the leaves until the breasts stop feeling overfull. (LC instructed to use cabbage leaves only if needed to right breast, and not left breast until wound is healed)  Handle your breasts very gently as they can bruise easily. Whenever your breasts feel too full, express a little milk. Express only enough to make you comfortable. Your body will replace what you remove. Mild painkiller medications may help relieve pain. Your doctor will be able to advise you about this. Drink when you are thirsty. Cutting down fluids will not help reduce your milk supply. For the first few days you may be uncomfortable lying in bed because your breasts are so full. Try lying on your back or on one side with an extra pillow supporting your breasts. If you like to lie on your front, place a pillow under your hips and stomach to ease the pressure on your breasts. Place a soft towel or cloth  across your breasts to soak up any leaking milk. Continue to call [facility] for support during hospital stay, and f/u with outpatient lactation support once discharged home. Response: MOB very appreciative of care. Verbalizes understanding of education that was provided. Comfortable with pumping as needed at this time, and ways to help relieve breast fullness and discomfort.

## 2022-03-13 NOTE — PROGRESS NOTES
Assessment complete and charted earlier in shift. Pt with continued c/o feeling like she has increased L breast milk production- lactation visit ordered per AM RN yesterday. Dressing remains C/D/I. Pt does continue to require PRN pain meds- IV toradol given x1 and PO sterling given x3 tonight. Pt able to rest. Call light within reach, will continue to monitor. Name band;

## 2022-03-13 NOTE — PROGRESS NOTES
Hospitalist Progress Note  3/13/2022 11:30 AM  Subjective:   Admit Date: 3/11/2022   PCP: Sita  Status:  Inpatient  Interval History: Hospital Day: 3, post-op day #1 left breast I&D with culture c/w MRSA (sensitivity pending). History of present illness:  Admitted with postpartum left breast abscess refractory to oral antibiotic prescribed by OB. She is  with vaginal delivery 4 weeks ago. She recently had vaginal delivery and is breast feeding.  She developed L breast redness. Incision and drainage (3/12, Dr Tamanna Muñoz). History of anxiety, bipolar type II, depression, and hepatitis C.      Diet: Regular  Right antecubital peripheral IV ((3/11, day #3)  Medications:     vancomycin  1,250 mg IntraVENous Q12H (3/11, day #3)     Recent Labs     22  1623 22  0537   WBC 11.2* 9.2   HGB 12.6 12.1    353   MCV 92.0 92.7     Recent Labs     22  1623      K 4.4      CO2 25   BUN 13   CREATININE 0.8   GLUCOSE 101*     Recent Labs     22  1623   AST 21   ALT 26   BILITOT <0.2   ALKPHOS 122     SARS-CoV-2 NAAT (3/11) not detected  Serum hCG (3/11) negative  Lactate (3/11) 0.8 mmol/L    Left breast wound culture (3/12) MRSA  Blood culture x 2 (3/11) no growth to date     Left breast ultrasound (3/11) Inhomogeneous 4.1 cm collection in the 2 o'clock position of the left breast,  consistent with abscess.  Neoplasm is less likely.  Continued surveillance is recommended.     Portable CXR (3/11) No airspace disease by radiograph given suboptimal evaluation of the lung bases due to overlying soft tissue.     Objective:   Vitals:  /75   Pulse 78   Temp 97.3 °F (36.3 °C) (Oral)   Resp 16   Ht 5' 7\" (1.702 m)   Wt 183 lb (83 kg)   LMP 2021   SpO2 97%   BMI 28.66 kg/m²   General appearance: alert and cooperative with exam  Lungs: clear to auscultation bilaterally   Chest: Left breast post-op, dressing clean / dry  Heart: regular rate and rhythm, S1, S2 normal, no murmur, click, rub or gallop  Abdomen: soft, non-tender; bowel sounds normal; no masses,  no organomegaly  Extremities: extremities normal, atraumatic, no cyanosis or edema  Neurologic: No obvious focal neurologic deficits. Assessment and Plan:   1. Left breast MRSA abscess s/p incision and drainage (3/12) on vancomycin and ceftriaxone. Discontinue ceftriaxone and continue on vancomycin pending MRSA sensitivity. Contact isolation. Likely discharge on oral Bactrim. Pt has decided to stop breast feeding. Infant is solely on formula. Lactation consultant per OB/Gyn.  2. Hepatitis C (diagnosed 2013): She was preparing to get treated when pregnancy test positive. Outpatient follow up including liver elastography at [facility]. 3. Depression:  Not on medication. Outpatient follow up. 4. Bipolar type II. Medication was discontinued while pregnant (over 10 months). Outpatient follow up. She has been doing well off medication. 5. Nicotine dependence, smokes 1-2 cigs/day . No need for nicotine replacement therapy.      Advance Directive: Full Code  DVT prophylaxis with enoxaparin 40 mg sub-Q daily. Discharge planning: pending surgical evaluation, on oral antibiotics, likely Monday, March 14  Follow-up ultrasound is recommended in 3 months. Post-partum appointment with Dr. Anna Shoemaker on March 16.          Jeane Alvarez MD  [facility]

## 2022-03-13 NOTE — PROGRESS NOTES
[facility] GENERAL SURGERY DAILY PROGRESS NOTE    SUBJECTIVE: Awake, alert    OBJECTIVE: CURRENT VITALS:  /75   Pulse 78   Temp 97.3 °F (36.3 °C) (Oral)   Resp 16   Ht 5' 7\" (1.702 m)   Wt 183 lb (83 kg)   LMP 05/26/2021   SpO2 97%   BMI 28.66 kg/m²          L breast packing removed. No evident undrained pus. Looks good. LABS:    CBC: Recent Labs     03/11/22  1623 03/12/22  0537   WBC 11.2* 9.2   RBC 4.12 3.97*   HGB 12.6 12.1   HCT 37.9 36.9   MCV 92.0 92.7   RDW 13.1 13.0    353     BMP:   Recent Labs     03/11/22  1623      K 4.4      CO2 25   BUN 13   CREATININE 0.8           ASSESSMENT:   POD 1 I&D L breast abscess      PLAN:   Continue antibiotics. Local wound care currently is dry dressing over the I&D site as patient refused despite multiple requests and explanations of need for replacement of some saline packing. If she changes her mind then can initiate NS wet to dry packing to open area L breast daily. May shower. Home with PO antibiotics when Martha Hose with medical team.   Follow up as needed with me as outpatient.         Urmila Schuler MD

## 2022-03-13 NOTE — PROGRESS NOTES
S:  Pt has decided to stop breast feeding. Infant is solely on formula. Denies F/C/NS, drainage or bleeding from left breast. +tenderness - less than prior to surgery. Denies SOB, HA, palpitations, N/V/D/C or urinary complaints.  Denies VB.   0:   Vitals:    22 1513 22 2349 22 0340   BP: 116/65 108/74 108/71 95/66   Pulse: 90 80 80 72   Resp: 16 16 16 16   Temp: 97.4 °F (36.3 °C) 97.8 °F (36.6 °C) 98.1 °F (36.7 °C) 98.1 °F (36.7 °C)   TempSrc: Oral Oral Oral Oral   SpO2: 96% 96% 97% 97%   Weight:       Height:         GELY: CTA b/l  Breasts: Left breast - bandage in place - C/D/I  Rt breast - NT, no masses,no nipple d/c  Abd: soft, NT, ND, +BS  Ext: no c/c/e    Recent Labs     22  0537   WBC 9.2   RBC 3.97*   HGB 12.1   HCT 36.9   MCV 92.7   RDW 13.0        A/P: 28 y/o  s/p  on 2/10/22 w/ left breast abscess, POD #1 form left breast I&D  - healing appropriately, final plan per surgeon  -Breast feeding - d/w pt. measures for decreasing milk supply, lactation consultant available prn

## 2022-03-13 NOTE — CARE COORDINATION
CASE MANAGEMENT INITIAL ASSESSMENT      Reviewed chart and completed assessment with patient:bedside  Explained Case Management role/services. Primary contact information:[facility] Decision Maker :   Primary Decision Maker: Deisy Diaz Se - Parent - [phone]    Primary Decision Maker: Catalina Hines - Parent - [phone]          Can this person be reached and be able to respond quickly, such as within a few minutes or hours? Yes    Admit date/status:3/11/22  Diagnosis:breast abscess   Is this a Readmission?:  Yes  Post partem breast infection    Insurance:[facility]   Precert required for SNF: Yes       3 night stay required: No    Living arrangements, Adls, care needs, prior to admission:lives in an apartment with boyfriend. 2nd floor    [address] at home:  Walker__Cane__RTS__ BSC__Shower Chair__  02__ HHN__ CPAP__  BiPap__  Hospital Bed__ W/C___ Other__________    Services in the home and/or outpatient, prior to [address] Notification (HEN):needed for SNf    Barriers to discharge: no PCP,  Offered list, declined stated she already knows who she wants to see. Plan/comments:spoke with atient. Plans on retuning home at discharge. Reported. IPTA. Infant is in care of father. Father has support at home. Patient started her dad will be able to assist with transportation to appointments. Will follow with breast drs at d/c. [facility] for IVATBX.  Pancho Barrios RN      [facility] on chart for MD signature

## 2022-03-14 VITALS
RESPIRATION RATE: 20 BRPM | HEIGHT: 67 IN | TEMPERATURE: 97.5 F | BODY MASS INDEX: 28.72 KG/M2 | DIASTOLIC BLOOD PRESSURE: 75 MMHG | OXYGEN SATURATION: 98 % | SYSTOLIC BLOOD PRESSURE: 111 MMHG | WEIGHT: 183 LBS | HEART RATE: 103 BPM

## 2022-03-14 LAB
ANION GAP SERPL CALCULATED.3IONS-SCNC: 12 MMOL/L (ref 3–16)
BASOPHILS ABSOLUTE: 0 K/UL (ref 0–0.2)
BASOPHILS RELATIVE PERCENT: 0.4 %
BUN BLDV-MCNC: 12 MG/DL (ref 7–20)
CALCIUM SERPL-MCNC: 8.9 MG/DL (ref 8.3–10.6)
CHLORIDE BLD-SCNC: 102 MMOL/L (ref 99–110)
CO2: 23 MMOL/L (ref 21–32)
CREAT SERPL-MCNC: 0.7 MG/DL (ref 0.6–1.1)
EOSINOPHILS ABSOLUTE: 0.7 K/UL (ref 0–0.6)
EOSINOPHILS RELATIVE PERCENT: 8.4 %
GFR AFRICAN AMERICAN: >60
GFR NON-AFRICAN AMERICAN: >60
GLUCOSE BLD-MCNC: 92 MG/DL (ref 70–99)
HCT VFR BLD CALC: 33.7 % (ref 36–48)
HEMOGLOBIN: 11.1 G/DL (ref 12–16)
LYMPHOCYTES ABSOLUTE: 1.9 K/UL (ref 1–5.1)
LYMPHOCYTES RELATIVE PERCENT: 24.2 %
MCH RBC QN AUTO: 30.2 PG (ref 26–34)
MCHC RBC AUTO-ENTMCNC: 33 G/DL (ref 31–36)
MCV RBC AUTO: 91.5 FL (ref 80–100)
MONOCYTES ABSOLUTE: 0.6 K/UL (ref 0–1.3)
MONOCYTES RELATIVE PERCENT: 7.8 %
NEUTROPHILS ABSOLUTE: 4.8 K/UL (ref 1.7–7.7)
NEUTROPHILS RELATIVE PERCENT: 59.2 %
PDW BLD-RTO: 12.9 % (ref 12.4–15.4)
PLATELET # BLD: 306 K/UL (ref 135–450)
PMV BLD AUTO: 8.3 FL (ref 5–10.5)
POTASSIUM SERPL-SCNC: 4.6 MMOL/L (ref 3.5–5.1)
RBC # BLD: 3.68 M/UL (ref 4–5.2)
SODIUM BLD-SCNC: 137 MMOL/L (ref 136–145)
WBC # BLD: 8 K/UL (ref 4–11)

## 2022-03-14 PROCEDURE — 36415 COLL VENOUS BLD VENIPUNCTURE: CPT

## 2022-03-14 PROCEDURE — 6370000000 HC RX 637 (ALT 250 FOR IP): Performed by: SURGERY

## 2022-03-14 PROCEDURE — 99024 POSTOP FOLLOW-UP VISIT: CPT | Performed by: SURGERY

## 2022-03-14 PROCEDURE — 80048 BASIC METABOLIC PNL TOTAL CA: CPT

## 2022-03-14 PROCEDURE — 85025 COMPLETE CBC W/AUTO DIFF WBC: CPT

## 2022-03-14 PROCEDURE — 2580000003 HC RX 258: Performed by: SURGERY

## 2022-03-14 PROCEDURE — 6360000002 HC RX W HCPCS: Performed by: SURGERY

## 2022-03-14 RX ORDER — CLINDAMYCIN HYDROCHLORIDE 300 MG/1
300 CAPSULE ORAL 3 TIMES DAILY
Qty: 21 CAPSULE | Refills: 0 | Status: SHIPPED | OUTPATIENT
Start: 2022-03-14 | End: 2022-03-21

## 2022-03-14 RX ORDER — OXYCODONE HYDROCHLORIDE 5 MG/1
5 TABLET ORAL EVERY 4 HOURS PRN
Qty: 12 TABLET | Refills: 0 | Status: SHIPPED | OUTPATIENT
Start: 2022-03-14 | End: 2022-03-17

## 2022-03-14 RX ADMIN — OXYCODONE 10 MG: 5 TABLET ORAL at 06:20

## 2022-03-14 RX ADMIN — VANCOMYCIN HYDROCHLORIDE 1250 MG: 10 INJECTION, POWDER, LYOPHILIZED, FOR SOLUTION INTRAVENOUS at 09:23

## 2022-03-14 RX ADMIN — SODIUM CHLORIDE, PRESERVATIVE FREE 10 ML: 5 INJECTION INTRAVENOUS at 09:19

## 2022-03-14 RX ADMIN — HYDROMORPHONE HYDROCHLORIDE 1 MG: 2 INJECTION, SOLUTION INTRAMUSCULAR; INTRAVENOUS; SUBCUTANEOUS at 09:17

## 2022-03-14 ASSESSMENT — PAIN SCALES - GENERAL
PAINLEVEL_OUTOF10: 7
PAINLEVEL_OUTOF10: 5

## 2022-03-14 ASSESSMENT — PAIN DESCRIPTION - PAIN TYPE: TYPE: ACUTE PAIN;SURGICAL PAIN

## 2022-03-14 NOTE — PROGRESS NOTES
Pt. Resting in bed. Alert/oriented. Vitals and assessment stable as charted. Currently pain to L breast area is 5/10; administered 1 mg dilaudid in preparation for AM dressing change. She is extremely anxious regarding dressing change and tearful. Tolerating diet okay. Call light in reach. Will continue to monitor.

## 2022-03-14 NOTE — PROGRESS NOTES
[facility] GENERAL SURGERY    Surgery Progress Note           POD # 2    PATIENT NAME: Lillian De La Cruz     TODAY'S DATE: 3/14/2022    INTERVAL HISTORY:    Pt with mild pain. OBJECTIVE:   VITALS:  /75   Pulse 103   Temp 97.5 °F (36.4 °C) (Oral)   Resp 20   Ht 5' 7\" (1.702 m)   Wt 183 lb (83 kg)   LMP 05/26/2021   SpO2 98%   BMI 28.66 kg/m²     INTAKE/OUTPUT:    I/O last 3 completed shifts: In: 2150 [P.O.:2140; I.V.:10]  Out: -   I/O this shift:  In: 360 [P.O.:360]  Out: -               CONSTITUTIONAL:  awake and alert  Left breast - wound base clean and minimal erythema    Data:  CBC: Recent Labs     03/11/22  1623 03/12/22  0537 03/14/22  0604   WBC 11.2* 9.2 8.0   HGB 12.6 12.1 11.1*   HCT 37.9 36.9 33.7*    353 306     BMP:    Recent Labs     03/11/22  1623 03/14/22  0604    137   K 4.4 4.6    102   CO2 25 23   BUN 13 12   CREATININE 0.8 0.7   GLUCOSE 101* 92     Hepatic:   Recent Labs     03/11/22  1623   AST 21   ALT 26   BILITOT <0.2   ALKPHOS 122     Mag:    No results for input(s): MG in the last 72 hours. Phos:   No results for input(s): PHOS in the last 72 hours. INR: No results for input(s): INR in the last 72 hours. Radiology Review:  NA    ASSESSMENT AND PLAN:  27 y.o. female status post left breast abscess I&D  1. Ok to leave packing out  2. Discussed daily gauze dressing changes at home  3.   Ok to discharge on oral abx         Electronically signed by Sav Hines MD

## 2022-03-14 NOTE — PROGRESS NOTES
Discharge: Pt discharged to home as per order. IV removed. Prescriptions\instructions reviewed. Pt verbalized understanding. Denied questions. Taken out via w\c in stable & ambulatory condition.

## 2022-03-14 NOTE — OP NOTE
[facility] [address] [address]                                OPERATIVE REPORT    PATIENT NAME: Tre Parham                 :        1995  MED REC NO:   [id number]                          ROOM:       0335  ACCOUNT NO:   [de-identified]                           ADMIT DATE: 2022  PROVIDER:     Tristan Lind MD    DATE OF PROCEDURE:  2022    LOCATION:  [place]. PREOPERATIVE DIAGNOSIS:  Left breast abscess. POSTOPERATIVE DIAGNOSIS:  Left breast abscess. OPERATION PERFORMED:  Incision and drainage, deep left breast abscess. SURGEON:  Tristan Lind MD    ANESTHESIA:  General.    COMPLICATIONS:  None. ESTIMATED BLOOD LOSS:  Less than 50 mL. INDICATIONS FOR THE OPERATION:  A 61-year-old female, recently  postpartum, is breastfeeding. She developed a red tender swollen  fluctuant area in the upper outer quadrant of her left breast.  I  recommended operative incision and drainage. The risks and benefits  were explained. The patient understood them, accepted them, and elected  to proceed. DESCRIPTION OF OPERATION:  The patient was brought to the operating  room. General anesthesia was induced. She was prepped and draped in  the usual surgical sterile fashion. Local anesthetic was infiltrated. An incision was made. Copious amounts of pus were evacuated. Cultures  were obtained. The wound was irrigated. The wound was packed and  dressing placed. DISPOSITION:  The patient tolerated the procedure without any acute  complication.         Zuleika Pak MD    D: 2022 6:24:04       T: 2022 7:28:38     [email or web address]  Job#: [id number]     Doc#: [id number]    CC:

## 2022-03-14 NOTE — PROGRESS NOTES
Assessment complete and charted earlier in shift. Attempted L breast dressing but pt did not tolerate with sterling IR. PM NP paged for anxiolytic- message read with no response. Pt would like to wait until wound care sees her today. Call light within reach, will continue to monitor.

## 2022-03-14 NOTE — DISCHARGE SUMMARY
Hospital Discharge Summary    Patient's PCP: No primary care provider on file. Admit Date: 3/11/2022   Discharge Date: 3/14/2022    Admitting Physician: Dr. Demarco Avila,   Discharge Physician: Dr. Sam Huffman MD   Consults: general surgery and OBGYN    Brief HPI:     27 y.o. female who presented to [facility] with past medical history of anxiety, bipolar type II, depression, hepatitis C presented to the ED with chief complaint of breast pain.     Patient reported she started developing left breast pain localized to the anterior lateral progressively worsening becoming red erythematous tender. Patient went to Dr. Meghann Kennedy office was taken 10-day course of antibiotic(dicloxacillin) but continued to spike fevers with severe pain. Patient reported that she continues to breast-feed from the right side because the left side does not give any milk. Patient reported after being on antibiotic did not improve patient came here. Patient reported that the pain is very debilitating no known alleviating exacerbating factor. Brief hospital course:     -Left breast abscess status post I&D 3/12/22. Clement Fly Abscess cultures positive for MRSA. . Treated with IV vancomycin, discharged on clindamycin based on sensitivities. . Follow-up outpatient with general surgery     -Hepatitis C (diagnosed ): She was preparing to get treated when pregnancy test positive. Outpatient follow up including liver elastography at [facility] in [place]. -Bipolar type II.  Medication was discontinued while pregnant (over 10 months).  Outpatient follow up. She has been doing well off medication.     -Nicotine dependence, smokes 1-2 cigs/day . - s/p  on 2/10/22    Invasive procedures:  See above    Discharge Diagnoses:   Principal Problem:    Breast abscess of female  Resolved Problems:    * No resolved hospital problems.  *      Physical Exam: /75   Pulse 103   Temp 97.5 °F (36.4 °C) (Oral) Resp 20   Ht 5' 7\" (1.702 m)   Wt 183 lb (83 kg)   LMP 05/26/2021   SpO2 98%   BMI 28.66 kg/m²   Gen/overall appearance: Not in acute distress. Alert. Head: Normocephalic, atraumatic  Eyes: EOMI, good acuity  ENT:- Oral mucosa moist  Neck: No JVD, thyromegaly  CVS: Nml S1S2, no MRG, RRR  Pulm: Clear bilaterally. No crackles/wheezes  Gastrointestinal: Soft, NT/ND, +BS  Musculoskeletal: No edema. Warm  Neuro: No focal deficit. Moves extremity spontaneously. Psychiatry: Appropriate affect. Not agitated. Skin: Warm, dry with normal turgor. No rash        Significant Diagnostic Studies:    See above      Treatments: As above. Discharge Medications:     Medication List      START taking these medications    clindamycin 300 MG capsule  Commonly known as: CLEOCIN  Take 1 capsule by mouth 3 times daily for 7 days     oxyCODONE 5 MG immediate release tablet  Commonly known as: ROXICODONE  Take 1 tablet by mouth every 4 hours as needed for Pain for up to 3 days. CONTINUE taking these medications    acetaminophen 325 MG tablet  Commonly known as: TYLENOL     albuterol sulfate  (90 Base) MCG/ACT inhaler     ibuprofen 800 MG tablet  Commonly known as: ADVIL;MOTRIN  Take 1 tablet by mouth every 8 hours as needed for Pain     PRENATAL/FOLIC ACID PO           Where to Get Your Medications      These medications were sent to [address] [place] [address]    Phone: [phone]   · clindamycin 300 MG capsule  · oxyCODONE 5 MG immediate release tablet         Activity: activity as tolerated  Diet: ADULT DIET;  Regular      Disposition: home  Discharged Condition: Stable  Follow Up:   Sabra Domínguez MD  [facility] [address] [id number]    In 2 weeks      Juan Martinez [address]  [facility]  [phone]    Schedule an appointment as soon as possible for a visit          Code status:  Full Code         Total time spent on discharge, finalizing medications, referrals and arranging outpatient follow up was more than 45 minutes      Thank you Dr. Abdalla primary care provider on file. for the opportunity to be involved in this patients care.

## 2022-03-15 LAB
BLOOD CULTURE, ROUTINE: NORMAL
CULTURE, BLOOD 2: NORMAL

## 2022-03-17 LAB
ANAEROBIC CULTURE: ABNORMAL
GRAM STAIN RESULT: ABNORMAL
ORGANISM: ABNORMAL
WOUND/ABSCESS: ABNORMAL

## 2022-03-28 ENCOUNTER — OFFICE VISIT (OUTPATIENT)
Dept: SURGERY | Age: 27
End: 2022-03-28

## 2022-03-28 VITALS
DIASTOLIC BLOOD PRESSURE: 67 MMHG | HEIGHT: 67 IN | SYSTOLIC BLOOD PRESSURE: 105 MMHG | HEART RATE: 84 BPM | TEMPERATURE: 97.6 F | WEIGHT: 180 LBS | BODY MASS INDEX: 28.25 KG/M2

## 2022-03-28 DIAGNOSIS — L02.91 ABSCESS: Primary | ICD-10-CM

## 2022-03-28 PROCEDURE — 99024 POSTOP FOLLOW-UP VISIT: CPT | Performed by: SURGERY

## 2022-03-30 NOTE — PROGRESS NOTES
HPI: Nursing notes reviewed. Patient with minimal pain and drainage. No fevers. Chaperone present. ROS:  10 point review of systems performed with pertinent positives in HPI    Phys:    Skin - left breast wound 1.5x1.5x0.1cm with minimal erythema and drainage    Assesment: 28 yo s/p left breast abscess I&D    Plan: 1. Continue bandage until drainage stops   2.   Call if any concerns

## 2022-12-14 ENCOUNTER — HOSPITAL ENCOUNTER (EMERGENCY)
Age: 27
Discharge: HOME | End: 2022-12-14
Attending: EMERGENCY MEDICINE
Payer: MEDICAID

## 2022-12-14 VITALS
WEIGHT: 200 LBS | HEART RATE: 94 BPM | TEMPERATURE: 97.8 F | SYSTOLIC BLOOD PRESSURE: 140 MMHG | BODY MASS INDEX: 31.39 KG/M2 | HEIGHT: 67 IN | RESPIRATION RATE: 15 BRPM | DIASTOLIC BLOOD PRESSURE: 94 MMHG | OXYGEN SATURATION: 96 %

## 2022-12-14 DIAGNOSIS — L02.429 FURUNCLE OF AXILLA, UNSPECIFIED LATERALITY: Primary | ICD-10-CM

## 2022-12-14 PROCEDURE — 99283 EMERGENCY DEPT VISIT LOW MDM: CPT

## 2022-12-14 PROCEDURE — 6370000000 HC RX 637 (ALT 250 FOR IP): Performed by: EMERGENCY MEDICINE

## 2022-12-14 RX ORDER — CEPHALEXIN 500 MG/1
500 CAPSULE ORAL ONCE
Status: COMPLETED | OUTPATIENT
Start: 2022-12-14 | End: 2022-12-14

## 2022-12-14 RX ORDER — IBUPROFEN 600 MG/1
600 TABLET ORAL EVERY 6 HOURS PRN
Qty: 20 TABLET | Refills: 0 | Status: SHIPPED | OUTPATIENT
Start: 2022-12-14

## 2022-12-14 RX ORDER — CEPHALEXIN 500 MG/1
500 CAPSULE ORAL 4 TIMES DAILY
Qty: 40 CAPSULE | Refills: 0 | Status: SHIPPED | OUTPATIENT
Start: 2022-12-14 | End: 2022-12-24

## 2022-12-14 RX ORDER — ACETAMINOPHEN 500 MG
500 TABLET ORAL EVERY 4 HOURS PRN
Qty: 30 TABLET | Refills: 5 | Status: SHIPPED | OUTPATIENT
Start: 2022-12-14

## 2022-12-14 RX ORDER — SULFAMETHOXAZOLE AND TRIMETHOPRIM 800; 160 MG/1; MG/1
1 TABLET ORAL 2 TIMES DAILY
Qty: 20 TABLET | Refills: 0 | Status: SHIPPED | OUTPATIENT
Start: 2022-12-14 | End: 2022-12-24

## 2022-12-14 RX ORDER — CARIPRAZINE 1.5 MG/1
CAPSULE, GELATIN COATED ORAL
COMMUNITY
Start: 2022-11-16

## 2022-12-14 RX ORDER — SULFAMETHOXAZOLE AND TRIMETHOPRIM 800; 160 MG/1; MG/1
1 TABLET ORAL ONCE
Status: COMPLETED | OUTPATIENT
Start: 2022-12-14 | End: 2022-12-14

## 2022-12-14 RX ORDER — BUSPIRONE HYDROCHLORIDE 5 MG/1
TABLET ORAL
COMMUNITY
Start: 2022-11-16

## 2022-12-14 RX ADMIN — SULFAMETHOXAZOLE AND TRIMETHOPRIM 1 TABLET: 800; 160 TABLET ORAL at 17:04

## 2022-12-14 RX ADMIN — CEPHALEXIN 500 MG: 500 CAPSULE ORAL at 17:04

## 2022-12-14 ASSESSMENT — PAIN DESCRIPTION - PAIN TYPE: TYPE: ACUTE PAIN

## 2022-12-14 ASSESSMENT — PAIN DESCRIPTION - LOCATION: LOCATION: OTHER (COMMENT)

## 2022-12-14 ASSESSMENT — ENCOUNTER SYMPTOMS
SHORTNESS OF BREATH: 0
ABDOMINAL PAIN: 0
BACK PAIN: 0

## 2022-12-14 ASSESSMENT — PAIN SCALES - GENERAL: PAINLEVEL_OUTOF10: 3

## 2022-12-14 ASSESSMENT — PAIN DESCRIPTION - ORIENTATION: ORIENTATION: LEFT

## 2022-12-14 ASSESSMENT — PAIN DESCRIPTION - DESCRIPTORS: DESCRIPTORS: PRESSURE

## 2022-12-14 ASSESSMENT — PAIN - FUNCTIONAL ASSESSMENT: PAIN_FUNCTIONAL_ASSESSMENT: 0-10

## 2022-12-14 NOTE — ED PROVIDER NOTES
[facility]      Pt Name: Eugenia De La Cruz  MRN: [id number]  [place] 1995  Date of evaluation: 12/14/2022  Provider: Lakeshia Zavala MD    [address] [place] [address]       Chief Complaint   Patient presents with    Skin Problem     Pt reports two \"bumps\" in left armpit that she noticed on Monday, reports pressure. HISTORY OF PRESENT ILLNESS   (Location/Symptom, Timing/Onset, Context/Setting, Quality, Duration, Modifying Factors, Severity)  Note limiting factors. Eugenia De La Cruz is a 32 y.o. female who presents to the emergency department     Presents emergency department having some bumps in her left arm. Axilla she did have a MRSA infection in in her left breast about 9 months ago she did have to have that I&D on  She was not sure if it was related she does shave and she also uses underarm deodorant  Patient was advised to stop both of these activities  She denies fever chills denies confusion shaking chills or other systemic symptoms denies any other problems or skin rashes anywhere else    The history is provided by the patient. Nursing Notes were reviewed. REVIEW OF SYSTEMS    (2-9 systems for level 4, 10 or more for level 5)     Review of Systems   Constitutional:  Positive for activity change. Negative for chills, diaphoresis, fatigue and fever. Eyes:  Negative for visual disturbance. Respiratory:  Negative for shortness of breath. Cardiovascular:  Negative for chest pain. Gastrointestinal:  Negative for abdominal pain. Musculoskeletal:  Negative for back pain and neck pain. Skin:  Positive for rash and wound. Allergic/Immunologic: Negative for immunocompromised state. Neurological:  Negative for dizziness. Psychiatric/Behavioral:  Negative for behavioral problems. All other systems reviewed and are negative. Except as noted above the remainder of the review of systems was reviewed and negative.        PAST MEDICAL HISTORY     Past Medical History:   Diagnosis Date    Anxiety     Bipolar 2 disorder ([facility] [address]     Stopped medications with +UPT    Depression     H/O seasonal allergies     Hepatitis C     confirmed 8/29 per prenatal    Heroin abuse ([facility] [address]     last used 9/28/20- inpatient rehab. Finished outpatient rehab 4/2021    MRSA (methicillin resistant staph aureus) culture positive     Left Breast         SURGICAL HISTORY       Past Surgical History:   Procedure Laterality Date    HAND SURGERY Left 3/12/2022    LEFT BREAST INCISION AND DRAINAGE performed by Carlyn Mchugh MD at [address]       Previous Medications    BUSPIRONE (BUSPAR) 5 MG TABLET        VRAYLAR 1.5 MG CAPSULE           ALLERGIES     Patient has no known allergies. FAMILY HISTORY     No family history on file. SOCIAL HISTORY       Social History     Socioeconomic History    Marital status: Single   Tobacco Use    Smoking status: Every Day     Packs/day: 0.25     Years: 4.00     Pack years: 1.00     Types: Cigarettes    Smokeless tobacco: Never   Vaping Use    Vaping Use: Never used   Substance and Sexual Activity    Alcohol use: No     Alcohol/week: 1.0 standard drink     Types: 1 Glasses of wine per week    Drug use: No    Sexual activity: Yes     Partners: Male       SCREENINGS    Given Coma Scale  Eye Opening: Spontaneous  Best Verbal Response: Oriented  Best Motor Response: Obeys commands  Lynsey Coma Scale Score: 15          PHYSICAL EXAM    (up to 7 for level 4, 8 or more for level 5)     ED Triage Vitals [12/14/22 1634]   BP Temp Temp Source Heart Rate Resp SpO2 Height Weight   (!) 140/94 97.8 °F (36.6 °C) Oral 94 15 96 % 5' 7\" (1.702 m) 200 lb (90.7 kg)       Physical Exam  Vitals and nursing note reviewed. Constitutional:       General: She is not in acute distress. Appearance: She is well-developed. She is not diaphoretic. HENT:      Head: Normocephalic. Right Ear: Ear canal and external ear normal.      Left Ear: Ear canal and external ear normal.      Nose: Nose normal.      Mouth/Throat:      Mouth: Mucous membranes are moist.   Eyes:      Conjunctiva/sclera: Conjunctivae normal.      Pupils: Pupils are equal, round, and reactive to light. Neck:      Thyroid: No thyromegaly. Cardiovascular:      Rate and Rhythm: Normal rate and regular rhythm. Heart sounds: Normal heart sounds. No murmur heard. No friction rub. No gallop. Pulmonary:      Effort: Pulmonary effort is normal. No respiratory distress. Breath sounds: Normal breath sounds. Abdominal:      General: Bowel sounds are normal. There is no distension. Palpations: Abdomen is soft. Tenderness: There is no abdominal tenderness. Musculoskeletal:      Cervical back: Normal range of motion and neck supple. Skin:     General: Skin is warm. Findings: No lesion. Comments: 2 small less than half centimeter areas of probably folliculitis or furuncle or boil in the left axilla axilla  There is no severe surrounding cellulitis  There is no fluctuance or induration that needs to be drained  At this point a conservative approach is recommended   Neurological:      Mental Status: She is alert and oriented to person, place, and time. GCS: GCS eye subscore is 4. GCS verbal subscore is 5. GCS motor subscore is 6. Cranial Nerves: No cranial nerve deficit. Sensory: No sensory deficit. Motor: No abnormal muscle tone.       Coordination: Coordination normal.      Deep Tendon Reflexes: Reflexes normal.   Psychiatric:         Behavior: Behavior normal.       DIAGNOSTIC RESULTS     EKG: All EKG's are interpreted by the Emergency Department Physician who either signs or Co-signs this chart in the absence of a cardiologist.        RADIOLOGY:   Non-plain film images such as CT, Ultrasound and MRI are read by the radiologist. Plain radiographic images are visualized and preliminarily interpreted by the emergency physician with the below findings:        Interpretation per the Radiologist below, if available at the time of this note:    No orders to display           LABS:  No results found for this visit on 12/14/22. EMERGENCY DEPARTMENT COURSE and DIFFERENTIAL DIAGNOSIS/MDM:     Vitals:    12/14/22 1634   BP: (!) 140/94   Pulse: 94   Resp: 15   Temp: 97.8 °F (36.6 °C)   TempSrc: Oral   SpO2: 96%   Weight: 200 lb (90.7 kg)   Height: 5' 7\" (1.702 m)           MDM        REASSESSMENT          CRITICAL CARE TIME     CONSULTS:  None      PROCEDURES:     Procedures    MEDICATIONS GIVEN THIS VISIT:  Medications   cephALEXin (KEFLEX) capsule 500 mg (has no administration in time range)   sulfamethoxazole-trimethoprim (BACTRIM DS;SEPTRA DS) 800-160 MG per tablet 1 tablet (has no administration in time range)        FINAL IMPRESSION      1. Furuncle of axilla, unspecified laterality            DISPOSITION/PLAN   DISPOSITION Decision To Discharge 12/14/2022 04:54:14 PM      PATIENT REFERRED TO:  [facility]  [address] [place] [address], [address] [place] [address]    If symptoms worsen    [facility]) Pre-Services  [phone]  Schedule an appointment as soon as possible for a visit         DISCHARGE MEDICATIONS:  New Prescriptions    ACETAMINOPHEN (TYLENOL) 500 MG TABLET    Take 1 tablet by mouth every 4 hours as needed for Pain or Fever    CEPHALEXIN (KEFLEX) 500 MG CAPSULE    Take 1 capsule by mouth 4 times daily for 10 days    IBUPROFEN (IBU) 600 MG TABLET    Take 1 tablet by mouth every 6 hours as needed for Pain    SULFAMETHOXAZOLE-TRIMETHOPRIM (BACTRIM DS) 800-160 MG PER TABLET    Take 1 tablet by mouth 2 times daily for 10 days       Controlled Substances Monitoring  No flowsheet data found.         (Please note that portions of this note were completed with a voice recognition program.  Efforts were made to edit the dictations but occasionally words are mis-transcribed.)    Patient was advised to return to the Emergency Department if there was any worsening.     Marcia Jones MD (electronically signed)  Attending Emergency Physician         Abdiel Pardo MD  12/14/22 0191

## 2022-12-14 NOTE — ED NOTES
Discharge instructions and medications reviewed with patient. Patient verbalized understanding of medications and follow up. All questions answered at this time. Skin warm, pink, and dry. Patient alert and oriented x4. Pt ambulated to lobby with a stable gait. Patient discharged home with 4 prescriptions.        Sameer Shepherd RN  12/14/22 2885

## 2022-12-14 NOTE — DISCHARGE INSTRUCTIONS
Do soapy warm compresses 4 times a day for 15 minutes as verbally instructed  Take Bactrim DS twice a day for 10 days  Take Keflex 4 times a day for 10 days  Acetaminophen 650 mg every 4 hours  Ibuprofen 600 mg 3 times a day with food  Follow-up with your previous surgeons if worsens or go to the main hospital

## 2023-04-11 ENCOUNTER — APPOINTMENT (OUTPATIENT)
Dept: GENERAL RADIOLOGY | Age: 28
End: 2023-04-11
Payer: MEDICAID

## 2023-04-11 ENCOUNTER — HOSPITAL ENCOUNTER (EMERGENCY)
Age: 28
Discharge: HOME | End: 2023-04-11
Attending: EMERGENCY MEDICINE
Payer: MEDICAID

## 2023-04-11 VITALS
OXYGEN SATURATION: 99 % | BODY MASS INDEX: 32.96 KG/M2 | TEMPERATURE: 98.1 F | HEART RATE: 65 BPM | HEIGHT: 67 IN | SYSTOLIC BLOOD PRESSURE: 98 MMHG | DIASTOLIC BLOOD PRESSURE: 64 MMHG | RESPIRATION RATE: 16 BRPM | WEIGHT: 210 LBS

## 2023-04-11 DIAGNOSIS — R07.9 CHEST PAIN, UNSPECIFIED TYPE: Primary | ICD-10-CM

## 2023-04-11 LAB
ALBUMIN SERPL-MCNC: 4.4 G/DL (ref 3.4–5)
ALBUMIN/GLOB SERPL: 1.2 {RATIO} (ref 1.1–2.2)
ALP SERPL-CCNC: 97 U/L (ref 40–129)
ALT SERPL-CCNC: 35 U/L (ref 10–40)
ANION GAP SERPL CALCULATED.3IONS-SCNC: 9 MMOL/L (ref 3–16)
AST SERPL-CCNC: 29 U/L (ref 15–37)
BASOPHILS # BLD: 0.1 K/UL (ref 0–0.2)
BASOPHILS NFR BLD: 0.8 %
BILIRUB SERPL-MCNC: 0.4 MG/DL (ref 0–1)
BUN SERPL-MCNC: 9 MG/DL (ref 7–20)
CALCIUM SERPL-MCNC: 9.5 MG/DL (ref 8.3–10.6)
CHLORIDE SERPL-SCNC: 100 MMOL/L (ref 99–110)
CO2 SERPL-SCNC: 25 MMOL/L (ref 21–32)
CREAT SERPL-MCNC: 0.6 MG/DL (ref 0.6–1.1)
DEPRECATED RDW RBC AUTO: 13.8 % (ref 12.4–15.4)
EKG ATRIAL RATE: 82 BPM
EKG DIAGNOSIS: NORMAL
EKG P AXIS: 74 DEGREES
EKG P-R INTERVAL: 118 MS
EKG Q-T INTERVAL: 378 MS
EKG QRS DURATION: 86 MS
EKG QTC CALCULATION (BAZETT): 441 MS
EKG R AXIS: 84 DEGREES
EKG T AXIS: 46 DEGREES
EKG VENTRICULAR RATE: 82 BPM
EOSINOPHIL # BLD: 0.6 K/UL (ref 0–0.6)
EOSINOPHIL NFR BLD: 6.8 %
GFR SERPLBLD CREATININE-BSD FMLA CKD-EPI: >60 ML/MIN/{1.73_M2}
GLUCOSE SERPL-MCNC: 92 MG/DL (ref 70–99)
HCG SERPL QL: NEGATIVE
HCT VFR BLD AUTO: 41 % (ref 36–48)
HGB BLD-MCNC: 13.5 G/DL (ref 12–16)
LYMPHOCYTES # BLD: 2.2 K/UL (ref 1–5.1)
LYMPHOCYTES NFR BLD: 26.5 %
MCH RBC QN AUTO: 28.9 PG (ref 26–34)
MCHC RBC AUTO-ENTMCNC: 32.8 G/DL (ref 31–36)
MCV RBC AUTO: 88 FL (ref 80–100)
MONOCYTES # BLD: 0.5 K/UL (ref 0–1.3)
MONOCYTES NFR BLD: 6.4 %
NEUTROPHILS # BLD: 5 K/UL (ref 1.7–7.7)
NEUTROPHILS NFR BLD: 59.5 %
PLATELET # BLD AUTO: 334 K/UL (ref 135–450)
PMV BLD AUTO: 8.4 FL (ref 5–10.5)
POTASSIUM SERPL-SCNC: 3.9 MMOL/L (ref 3.5–5.1)
PROT SERPL-MCNC: 8.1 G/DL (ref 6.4–8.2)
RBC # BLD AUTO: 4.66 M/UL (ref 4–5.2)
SODIUM SERPL-SCNC: 134 MMOL/L (ref 136–145)
TROPONIN T SERPL-MCNC: <0.01 NG/ML
WBC # BLD AUTO: 8.4 K/UL (ref 4–11)

## 2023-04-11 PROCEDURE — 85025 COMPLETE CBC W/AUTO DIFF WBC: CPT

## 2023-04-11 PROCEDURE — 84703 CHORIONIC GONADOTROPIN ASSAY: CPT

## 2023-04-11 PROCEDURE — 84484 ASSAY OF TROPONIN QUANT: CPT

## 2023-04-11 PROCEDURE — 80053 COMPREHEN METABOLIC PANEL: CPT

## 2023-04-11 PROCEDURE — 93005 ELECTROCARDIOGRAM TRACING: CPT | Performed by: EMERGENCY MEDICINE

## 2023-04-11 PROCEDURE — 71045 X-RAY EXAM CHEST 1 VIEW: CPT

## 2023-04-11 PROCEDURE — 96374 THER/PROPH/DIAG INJ IV PUSH: CPT

## 2023-04-11 PROCEDURE — 6360000002 HC RX W HCPCS: Performed by: EMERGENCY MEDICINE

## 2023-04-11 PROCEDURE — 99285 EMERGENCY DEPT VISIT HI MDM: CPT

## 2023-04-11 PROCEDURE — 93010 ELECTROCARDIOGRAM REPORT: CPT | Performed by: INTERNAL MEDICINE

## 2023-04-11 PROCEDURE — 36415 COLL VENOUS BLD VENIPUNCTURE: CPT

## 2023-04-11 RX ORDER — KETOROLAC TROMETHAMINE 30 MG/ML
30 INJECTION, SOLUTION INTRAMUSCULAR; INTRAVENOUS ONCE
Status: COMPLETED | OUTPATIENT
Start: 2023-04-11 | End: 2023-04-11

## 2023-04-11 RX ADMIN — KETOROLAC TROMETHAMINE 30 MG: 30 INJECTION, SOLUTION INTRAMUSCULAR at 13:37

## 2023-04-11 ASSESSMENT — PAIN DESCRIPTION - ONSET: ONSET: SUDDEN

## 2023-04-11 ASSESSMENT — PAIN DESCRIPTION - PAIN TYPE: TYPE: ACUTE PAIN

## 2023-04-11 ASSESSMENT — PAIN - FUNCTIONAL ASSESSMENT: PAIN_FUNCTIONAL_ASSESSMENT: 0-10

## 2023-04-11 ASSESSMENT — PAIN DESCRIPTION - FREQUENCY: FREQUENCY: CONTINUOUS

## 2023-04-11 ASSESSMENT — PAIN DESCRIPTION - LOCATION: LOCATION: CHEST

## 2023-04-11 ASSESSMENT — PAIN SCALES - GENERAL
PAINLEVEL_OUTOF10: 3
PAINLEVEL_OUTOF10: 4

## 2023-04-11 ASSESSMENT — PAIN DESCRIPTION - DESCRIPTORS: DESCRIPTORS: OTHER (COMMENT)

## 2023-04-11 ASSESSMENT — HEART SCORE: ECG: 0

## 2023-04-11 NOTE — DISCHARGE INSTRUCTIONS
Please continue to take an anti-inflammatory pain medication such as Motrin or Aleve, which you can get over-the-counter, for your pain for the next 3 to 5 days as needed. Follow-up closely with your primary care provider for recheck.

## 2023-04-11 NOTE — ED PROVIDER NOTES
[facility] EMERGENCY DEPARTMENT      CHIEF COMPLAINT  Chest Pain (Chest pain started around 930am this morning. Pt states its constant with intermittent sharper pains.)       HISTORY OF PRESENT ILLNESS  Lillian De La Cruz is a 32 y.o. female  who presents to the ED complaining of sharp chest pain that started this AM.  Was trying to clean with a vaccuum and clean dishes and had to sit down- felt very winded with doing that. Pain has eased up some. Never had anything like this before. Feels OK just sitting here. Gets the pain intermittently, but worse when up doing something. No leg pain/swelling. No recent travel. No fevers. Not on any OCPs. No hx blood clots or cardiac disease. Has not taken anything for the pain. No other complaints, modifying factors or associated symptoms. I have reviewed the following from the nursing documentation. Past Medical History:   Diagnosis Date    Anxiety     Bipolar 2 disorder ([facility] [address]     Stopped medications with +UPT    Depression     H/O seasonal allergies     Hepatitis C     confirmed 8/29 per prenatal    Heroin abuse ([facility] [address]     last used 9/28/20- inpatient rehab. Finished outpatient rehab 4/2021    MRSA (methicillin resistant staph aureus) culture positive     Left Breast     Past Surgical History:   Procedure Laterality Date    HAND SURGERY Left 3/12/2022    [facility]       History reviewed. No pertinent family history.   Social History     Socioeconomic History    Marital status: Single     Spouse name: Not on file    Number of children: Not on file    Years of education: Not on file    Highest education level: Not on file   Occupational History    Not on file   Tobacco Use    Smoking status: Every Day     Packs/day: 0.25     Years: 4.00     Pack years: 1.00     Types: Cigarettes, E-Cigarettes    Smokeless tobacco: Never   Vaping Use    Vaping Use:

## 2023-04-11 NOTE — ED NOTES
Pt discharge instructions, follow up reviewed with pt. Pt verbalized understanding. No further needs. Pt discharged at this time.         Bri Heredia RN  04/11/23 1625

## 2023-06-26 ENCOUNTER — HOSPITAL ENCOUNTER (OUTPATIENT)
Dept: ENDOSCOPY | Age: 28
Discharge: HOME | End: 2023-06-26
Payer: MEDICAID

## 2023-06-26 PROCEDURE — 91200 LIVER ELASTOGRAPHY: CPT

## 2023-07-11 DIAGNOSIS — R16.0 LIVER MASS, LEFT LOBE: Primary | ICD-10-CM

## 2023-07-13 DIAGNOSIS — R16.0 LIVER MASS, LEFT LOBE: Primary | ICD-10-CM

## 2023-07-27 ENCOUNTER — HOSPITAL ENCOUNTER (OUTPATIENT)
Dept: MRI IMAGING | Age: 28
Discharge: HOME | End: 2023-07-27
Attending: INTERNAL MEDICINE
Payer: MEDICAID

## 2023-07-27 DIAGNOSIS — R16.0 LIVER MASS, LEFT LOBE: ICD-10-CM

## 2023-07-27 PROCEDURE — 6360000004 HC RX CONTRAST MEDICATION: Performed by: INTERNAL MEDICINE

## 2023-07-27 PROCEDURE — 74183 MRI ABD W/O CNTR FLWD CNTR: CPT

## 2023-07-27 PROCEDURE — A9579 GAD-BASE MR CONTRAST NOS,1ML: HCPCS | Performed by: INTERNAL MEDICINE

## 2023-07-27 RX ADMIN — GADOTERIDOL 19 ML: 279.3 INJECTION, SOLUTION INTRAVENOUS at 17:05

## 2023-10-03 ENCOUNTER — HOSPITAL ENCOUNTER (EMERGENCY)
Age: 28
Discharge: HOME | End: 2023-10-03
Attending: EMERGENCY MEDICINE
Payer: MEDICAID

## 2023-10-03 VITALS
DIASTOLIC BLOOD PRESSURE: 85 MMHG | SYSTOLIC BLOOD PRESSURE: 125 MMHG | OXYGEN SATURATION: 98 % | HEIGHT: 67 IN | RESPIRATION RATE: 16 BRPM | TEMPERATURE: 98 F | BODY MASS INDEX: 32.96 KG/M2 | HEART RATE: 94 BPM | WEIGHT: 210 LBS

## 2023-10-03 DIAGNOSIS — J02.9 VIRAL PHARYNGITIS: Primary | ICD-10-CM

## 2023-10-03 LAB
S PYO AG THROAT QL: NEGATIVE
SARS-COV-2 RDRP RESP QL NAA+PROBE: NOT DETECTED

## 2023-10-03 PROCEDURE — 99283 EMERGENCY DEPT VISIT LOW MDM: CPT

## 2023-10-03 PROCEDURE — 87081 CULTURE SCREEN ONLY: CPT

## 2023-10-03 PROCEDURE — 87635 SARS-COV-2 COVID-19 AMP PRB: CPT

## 2023-10-03 PROCEDURE — 87880 STREP A ASSAY W/OPTIC: CPT

## 2023-10-03 ASSESSMENT — ENCOUNTER SYMPTOMS: RHINORRHEA: 1

## 2023-10-03 NOTE — ED NOTES
Reviewed patient discharge instructions at this time, copy given to patient. No questions or concerns. Patient voiced understanding.         Richy Levi RN  10/03/23 2084

## 2023-10-03 NOTE — DISCHARGE INSTRUCTIONS
Supportive care: #1 Tylenol 650 mg every 4 hours  Ibuprofen 600 mg 3 times a day  Drink lots of fluids get plenty of rest follow-up as needed

## 2023-10-03 NOTE — ED PROVIDER NOTES
[facility]      Pt Name: Henrietta Siu  MRN: [id number]  [address] [facility] 1995  Date of evaluation: 10/3/2023  Provider: China Espitia MD    [address]       Chief Complaint   Patient presents with    Pharyngitis     C/O sore throat and nasal drainage x2 days          HISTORY OF PRESENT ILLNESS   (Location/Symptom, Timing/Onset, Context/Setting, Quality, Duration, Modifying Factors, Severity)  Note limiting factors. Henrietta Siu is a 25 y.o. female who presents to the emergency department     Patient presents with sore throat. Patient denies any other major medical problems. Her records were reviewed. The history is provided by the patient. Nursing Notes were reviewed. REVIEW OF SYSTEMS    (2-9 systems for level 4, 10 or more for level 5)     Review of Systems   Constitutional:  Positive for activity change. HENT:  Positive for congestion and rhinorrhea. All other systems reviewed and are negative. Except as noted above the remainder of the review of systems was reviewed and negative. PAST MEDICAL HISTORY     Past Medical History:   Diagnosis Date    Anxiety     Bipolar 2 disorder ([address])     Stopped medications with +UPT    Depression     H/O seasonal allergies     Hepatitis C     confirmed 8/29 per prenatal    Heroin abuse ([address])     last used 9/28/20- inpatient rehab.  Finished outpatient rehab 4/2021    MRSA (methicillin resistant staph aureus) culture positive     Left Breast         SURGICAL HISTORY       Past Surgical History:   Procedure Laterality Date    HAND SURGERY Left 3/12/2022    LEFT BREAST INCISION AND DRAINAGE performed by Edgardo Plascencia MD at [facility]       Previous Medications    ACETAMINOPHEN (TYLENOL) 500 MG TABLET    Take 1 tablet by mouth every 4 hours as needed for Pain or Fever    BUSPIRONE (BUSPAR) 5 MG TABLET

## 2023-10-06 LAB — S PYO THROAT QL CULT: NORMAL

## 2025-07-06 ENCOUNTER — HOSPITAL ENCOUNTER (EMERGENCY)
Age: 30
Discharge: HOME | End: 2025-07-06
Attending: EMERGENCY MEDICINE
Payer: MEDICAID

## 2025-07-06 ENCOUNTER — APPOINTMENT (OUTPATIENT)
Dept: CT IMAGING | Age: 30
End: 2025-07-06
Payer: MEDICAID

## 2025-07-06 VITALS
DIASTOLIC BLOOD PRESSURE: 74 MMHG | WEIGHT: 199.1 LBS | OXYGEN SATURATION: 98 % | TEMPERATURE: 98.1 F | RESPIRATION RATE: 16 BRPM | HEART RATE: 98 BPM | HEIGHT: 67 IN | SYSTOLIC BLOOD PRESSURE: 124 MMHG | BODY MASS INDEX: 31.25 KG/M2

## 2025-07-06 DIAGNOSIS — R11.2 NAUSEA AND VOMITING, UNSPECIFIED VOMITING TYPE: ICD-10-CM

## 2025-07-06 DIAGNOSIS — R10.9 FLANK PAIN: Primary | ICD-10-CM

## 2025-07-06 LAB
ALBUMIN SERPL-MCNC: 4.8 G/DL (ref 3.4–5)
ALBUMIN/GLOB SERPL: 1.3 {RATIO} (ref 1.1–2.2)
ALP SERPL-CCNC: 76 U/L (ref 40–129)
ALT SERPL-CCNC: 9 U/L (ref 10–40)
ANION GAP SERPL CALCULATED.3IONS-SCNC: 13 MMOL/L (ref 3–16)
AST SERPL-CCNC: 13 U/L (ref 15–37)
BACTERIA URNS QL MICRO: ABNORMAL /HPF
BASOPHILS # BLD: 0 K/UL (ref 0–0.2)
BASOPHILS NFR BLD: 0.2 %
BILIRUB SERPL-MCNC: 0.5 MG/DL (ref 0–1)
BILIRUB UR QL STRIP.AUTO: NEGATIVE
BUN SERPL-MCNC: 9 MG/DL (ref 7–20)
CALCIUM SERPL-MCNC: 9.6 MG/DL (ref 8.3–10.6)
CHLORIDE SERPL-SCNC: 104 MMOL/L (ref 99–110)
CLARITY UR: CLEAR
CO2 SERPL-SCNC: 21 MMOL/L (ref 21–32)
COLOR UR: YELLOW
CREAT SERPL-MCNC: 0.8 MG/DL (ref 0.6–1.1)
DEPRECATED RDW RBC AUTO: 14.5 % (ref 12.4–15.4)
EOSINOPHIL # BLD: 0.3 K/UL (ref 0–0.6)
EOSINOPHIL NFR BLD: 3.3 %
EPI CELLS #/AREA URNS HPF: ABNORMAL /HPF (ref 0–5)
GFR SERPLBLD CREATININE-BSD FMLA CKD-EPI: >90 ML/MIN/{1.73_M2}
GLUCOSE SERPL-MCNC: 82 MG/DL (ref 70–99)
GLUCOSE UR STRIP.AUTO-MCNC: NEGATIVE MG/DL
HCG UR QL: NEGATIVE
HCT VFR BLD AUTO: 43.5 % (ref 36–48)
HGB BLD-MCNC: 14.5 G/DL (ref 12–16)
HGB UR QL STRIP.AUTO: ABNORMAL
KETONES UR STRIP.AUTO-MCNC: NEGATIVE MG/DL
LEUKOCYTE ESTERASE UR QL STRIP.AUTO: NEGATIVE
LIPASE SERPL-CCNC: 30 U/L (ref 13–60)
LYMPHOCYTES # BLD: 1.9 K/UL (ref 1–5.1)
LYMPHOCYTES NFR BLD: 19.2 %
MCH RBC QN AUTO: 29.2 PG (ref 26–34)
MCHC RBC AUTO-ENTMCNC: 33.3 G/DL (ref 31–36)
MCV RBC AUTO: 87.6 FL (ref 80–100)
MONOCYTES # BLD: 0.5 K/UL (ref 0–1.3)
MONOCYTES NFR BLD: 5.6 %
MUCOUS THREADS #/AREA URNS LPF: ABNORMAL /LPF
NEUTROPHILS # BLD: 7 K/UL (ref 1.7–7.7)
NEUTROPHILS NFR BLD: 71.7 %
NITRITE UR QL STRIP.AUTO: NEGATIVE
PH UR STRIP.AUTO: 6 [PH] (ref 5–8)
PLATELET # BLD AUTO: 318 K/UL (ref 135–450)
PMV BLD AUTO: 8.6 FL (ref 5–10.5)
POTASSIUM SERPL-SCNC: 4.3 MMOL/L (ref 3.5–5.1)
PROT SERPL-MCNC: 8.5 G/DL (ref 6.4–8.2)
PROT UR STRIP.AUTO-MCNC: NEGATIVE MG/DL
RBC # BLD AUTO: 4.97 M/UL (ref 4–5.2)
RBC #/AREA URNS HPF: ABNORMAL /HPF (ref 0–4)
SODIUM SERPL-SCNC: 138 MMOL/L (ref 136–145)
SP GR UR STRIP.AUTO: 1.02 (ref 1–1.03)
UA COMPLETE W REFLEX CULTURE PNL UR: ABNORMAL
UA DIPSTICK W REFLEX MICRO PNL UR: YES
URN SPEC COLLECT METH UR: ABNORMAL
UROBILINOGEN UR STRIP-ACNC: 0.2 E.U./DL
WBC # BLD AUTO: 9.7 K/UL (ref 4–11)
WBC #/AREA URNS HPF: ABNORMAL /HPF (ref 0–5)

## 2025-07-06 PROCEDURE — 80053 COMPREHEN METABOLIC PANEL: CPT

## 2025-07-06 PROCEDURE — 74177 CT ABD & PELVIS W/CONTRAST: CPT

## 2025-07-06 PROCEDURE — 84703 CHORIONIC GONADOTROPIN ASSAY: CPT

## 2025-07-06 PROCEDURE — 6360000002 HC RX W HCPCS: Performed by: EMERGENCY MEDICINE

## 2025-07-06 PROCEDURE — 96375 TX/PRO/DX INJ NEW DRUG ADDON: CPT

## 2025-07-06 PROCEDURE — 36415 COLL VENOUS BLD VENIPUNCTURE: CPT

## 2025-07-06 PROCEDURE — 2580000003 HC RX 258: Performed by: EMERGENCY MEDICINE

## 2025-07-06 PROCEDURE — 6360000004 HC RX CONTRAST MEDICATION: Performed by: EMERGENCY MEDICINE

## 2025-07-06 PROCEDURE — 83690 ASSAY OF LIPASE: CPT

## 2025-07-06 PROCEDURE — 81001 URINALYSIS AUTO W/SCOPE: CPT

## 2025-07-06 PROCEDURE — 85025 COMPLETE CBC W/AUTO DIFF WBC: CPT

## 2025-07-06 PROCEDURE — 96374 THER/PROPH/DIAG INJ IV PUSH: CPT

## 2025-07-06 PROCEDURE — 99285 EMERGENCY DEPT VISIT HI MDM: CPT

## 2025-07-06 RX ORDER — ONDANSETRON 4 MG/1
4 TABLET, ORALLY DISINTEGRATING ORAL 3 TIMES DAILY PRN
Qty: 21 TABLET | Refills: 0 | Status: SHIPPED | OUTPATIENT
Start: 2025-07-06

## 2025-07-06 RX ORDER — LIDOCAINE 50 MG/G
1 PATCH TOPICAL DAILY
Qty: 10 PATCH | Refills: 0 | Status: SHIPPED | OUTPATIENT
Start: 2025-07-06 | End: 2025-07-16

## 2025-07-06 RX ORDER — KETOROLAC TROMETHAMINE 15 MG/ML
15 INJECTION, SOLUTION INTRAMUSCULAR; INTRAVENOUS ONCE
Status: COMPLETED | OUTPATIENT
Start: 2025-07-06 | End: 2025-07-06

## 2025-07-06 RX ORDER — 0.9 % SODIUM CHLORIDE 0.9 %
1000 INTRAVENOUS SOLUTION INTRAVENOUS ONCE
Status: COMPLETED | OUTPATIENT
Start: 2025-07-06 | End: 2025-07-06

## 2025-07-06 RX ORDER — ONDANSETRON 2 MG/ML
4 INJECTION INTRAMUSCULAR; INTRAVENOUS ONCE
Status: COMPLETED | OUTPATIENT
Start: 2025-07-06 | End: 2025-07-06

## 2025-07-06 RX ORDER — IOPAMIDOL 755 MG/ML
75 INJECTION, SOLUTION INTRAVASCULAR
Status: COMPLETED | OUTPATIENT
Start: 2025-07-06 | End: 2025-07-06

## 2025-07-06 RX ORDER — METHOCARBAMOL 750 MG/1
750 TABLET, FILM COATED ORAL 4 TIMES DAILY
Qty: 40 TABLET | Refills: 0 | Status: SHIPPED | OUTPATIENT
Start: 2025-07-06 | End: 2025-07-16

## 2025-07-06 RX ORDER — IBUPROFEN 600 MG/1
600 TABLET, FILM COATED ORAL 3 TIMES DAILY PRN
Qty: 30 TABLET | Refills: 0 | Status: SHIPPED | OUTPATIENT
Start: 2025-07-06

## 2025-07-06 RX ADMIN — IOPAMIDOL 75 ML: 755 INJECTION, SOLUTION INTRAVENOUS at 13:57

## 2025-07-06 RX ADMIN — ONDANSETRON 4 MG: 2 INJECTION, SOLUTION INTRAMUSCULAR; INTRAVENOUS at 12:52

## 2025-07-06 RX ADMIN — KETOROLAC TROMETHAMINE 15 MG: 15 INJECTION, SOLUTION INTRAMUSCULAR; INTRAVENOUS at 12:52

## 2025-07-06 RX ADMIN — SODIUM CHLORIDE 1000 ML: 9 INJECTION, SOLUTION INTRAVENOUS at 12:52

## 2025-07-06 ASSESSMENT — PAIN DESCRIPTION - PAIN TYPE
TYPE: ACUTE PAIN

## 2025-07-06 ASSESSMENT — PAIN DESCRIPTION - ORIENTATION
ORIENTATION: RIGHT

## 2025-07-06 ASSESSMENT — PAIN DESCRIPTION - LOCATION
LOCATION: FLANK

## 2025-07-06 ASSESSMENT — PAIN - FUNCTIONAL ASSESSMENT
PAIN_FUNCTIONAL_ASSESSMENT: 0-10
PAIN_FUNCTIONAL_ASSESSMENT: 0-10
PAIN_FUNCTIONAL_ASSESSMENT: ACTIVITIES ARE NOT PREVENTED
PAIN_FUNCTIONAL_ASSESSMENT: 0-10
PAIN_FUNCTIONAL_ASSESSMENT: ACTIVITIES ARE NOT PREVENTED

## 2025-07-06 ASSESSMENT — PAIN DESCRIPTION - FREQUENCY
FREQUENCY: CONTINUOUS

## 2025-07-06 ASSESSMENT — PAIN DESCRIPTION - DESCRIPTORS
DESCRIPTORS: ACHING
DESCRIPTORS: ACHING
DESCRIPTORS: ACHING;DULL
DESCRIPTORS: SHARP;DULL;ACHING

## 2025-07-06 ASSESSMENT — PAIN DESCRIPTION - ONSET
ONSET: ON-GOING

## 2025-07-06 ASSESSMENT — PAIN SCALES - GENERAL
PAINLEVEL_OUTOF10: 3
PAINLEVEL_OUTOF10: 4
PAINLEVEL_OUTOF10: 6
PAINLEVEL_OUTOF10: 4

## 2025-07-06 NOTE — DISCHARGE INSTRUCTIONS
Your lab work, urinalysis and CT scan are reassuring.  You may have a pulled muscle in your back and it may be completely unrelated to the vomiting and diarrhea you had.  Which is likely from a stomach virus.  I am prescribing pain and nausea medicine for you to have at home in addition to muscle relaxers and pain patches.  Rest, increase your fluid intake.  Please follow-up with your primary doctor within the next several days.  If you are worsening at any point return to the ER immediately.

## 2025-07-06 NOTE — ED PROVIDER NOTES
Drug use: No    Sexual activity: Yes     Partners: Male   Other Topics Concern    Not on file   Social History Narrative    Not on file     Social Drivers of Health     Financial Resource Strain: Not on File (2019)    Received from [facility]    Financial Resource Strain     Financial Resource Strain: 0   Food Insecurity: Not on File (2019)    Received from [facility]    Food Insecurity     Food: 0   Transportation Needs: Not on File (2019)    Received from [facility]    Transportation Needs     Transportation: 0   Physical Activity: Not on File (2019)    Received from [facility]    Physical Activity     Physical Activity: 0   Stress: Not on File (2019)    Received from [facility]    Stress     Stress: 0   Social Connections: Not on File (2019)    Received from [facility]    Social Connections     Social Connections and Isolation: 0   Intimate Partner Violence: Not on file   Housing Stability: Not on File (2019)    Received from [facility]    Housing Stability     Housin     No current facility-administered medications for this encounter.     Current Outpatient Medications   Medication Sig Dispense Refill    lidocaine (LIDODERM) 5 % Place 1 patch onto the skin daily for 10 days 12 hours on, 12 hours off. 10 patch 0    methocarbamol (ROBAXIN) 750 MG tablet Take 1 tablet by mouth 4 times daily for 10 days 40 tablet 0    ondansetron (ZOFRAN-ODT) 4 MG disintegrating tablet Take 1 tablet by mouth 3 times daily as needed for Nausea or Vomiting 21 tablet 0    ibuprofen (ADVIL;MOTRIN) 600 MG tablet Take 1 tablet by mouth 3 times daily as needed for Pain 30 tablet 0    sertraline (ZOLOFT) 50 MG tablet Take 1 tablet by mouth daily      busPIRone (BUSPAR) 5 MG tablet       VRAYLAR 1.5 MG capsule       acetaminophen (TYLENOL) 500 MG tablet Take 1 tablet by mouth every 4 hours as needed for Pain or Fever 30 tablet 5     No Known Allergies    REVIEW OF SYSTEMS    Unless

## (undated) DEVICE — BANDAGE,GAUZE,4.5"X4.1YD,STERILE,LF: Brand: MEDLINE

## (undated) DEVICE — GLOVE ORTHO 8   MSG9480

## (undated) DEVICE — COVER,MAYO STAND,STERILE: Brand: MEDLINE

## (undated) DEVICE — CUTIMED SORBACT SWABS STERILE 4X6CM 5 US: Brand: CUTIMED® SORBACT®

## (undated) DEVICE — GOWN,REINF,POLY,AURORA,XLNG/XXL,STRL: Brand: MEDLINE

## (undated) DEVICE — SURGICAL PROCEDURE PACK IV U-BAR

## (undated) DEVICE — GAUZE,SPONGE,4"X4",8PLY,STRL,LF,10/TRAY: Brand: MEDLINE